# Patient Record
Sex: MALE | Race: ASIAN | NOT HISPANIC OR LATINO | Employment: FULL TIME | URBAN - METROPOLITAN AREA
[De-identification: names, ages, dates, MRNs, and addresses within clinical notes are randomized per-mention and may not be internally consistent; named-entity substitution may affect disease eponyms.]

---

## 2018-02-14 ENCOUNTER — OFFICE VISIT (OUTPATIENT)
Dept: INTERNAL MEDICINE | Facility: CLINIC | Age: 43
End: 2018-02-14

## 2018-02-14 VITALS
SYSTOLIC BLOOD PRESSURE: 132 MMHG | HEIGHT: 73 IN | OXYGEN SATURATION: 100 % | HEART RATE: 72 BPM | DIASTOLIC BLOOD PRESSURE: 82 MMHG | BODY MASS INDEX: 28.26 KG/M2 | WEIGHT: 213.2 LBS

## 2018-02-14 DIAGNOSIS — E03.8 OTHER SPECIFIED HYPOTHYROIDISM: Primary | ICD-10-CM

## 2018-02-14 DIAGNOSIS — R73.03 PREDIABETES: ICD-10-CM

## 2018-02-14 DIAGNOSIS — E78.5 HYPERLIPIDEMIA, UNSPECIFIED HYPERLIPIDEMIA TYPE: ICD-10-CM

## 2018-02-14 DIAGNOSIS — E55.9 VITAMIN D DEFICIENCY: ICD-10-CM

## 2018-02-14 LAB
BILIRUB BLD-MCNC: NEGATIVE MG/DL
CLARITY, POC: CLEAR
COLOR UR: YELLOW
GLUCOSE BLDC GLUCOMTR-MCNC: 179 MG/DL (ref 70–130)
GLUCOSE UR STRIP-MCNC: NEGATIVE MG/DL
HGB BLDA-MCNC: 60 G/DL
KETONES UR QL: NEGATIVE
LEUKOCYTE EST, POC: NEGATIVE
NITRITE UR-MCNC: NEGATIVE MG/ML
PH UR: 6 [PH] (ref 5–8)
PROT UR STRIP-MCNC: NEGATIVE MG/DL
RBC # UR STRIP: NEGATIVE /UL
SP GR UR: 1.01 (ref 1–1.03)
UROBILINOGEN UR QL: NORMAL

## 2018-02-14 PROCEDURE — 83036 HEMOGLOBIN GLYCOSYLATED A1C: CPT | Performed by: INTERNAL MEDICINE

## 2018-02-14 PROCEDURE — 99204 OFFICE O/P NEW MOD 45 MIN: CPT | Performed by: INTERNAL MEDICINE

## 2018-02-14 PROCEDURE — 82947 ASSAY GLUCOSE BLOOD QUANT: CPT | Performed by: INTERNAL MEDICINE

## 2018-02-14 PROCEDURE — 81003 URINALYSIS AUTO W/O SCOPE: CPT | Performed by: INTERNAL MEDICINE

## 2018-02-14 RX ORDER — MULTIVIT-MIN/IRON/FOLIC ACID/K 18-600-40
CAPSULE ORAL DAILY
COMMUNITY

## 2018-02-14 RX ORDER — CHLORAL HYDRATE 500 MG
1000 CAPSULE ORAL 2 TIMES DAILY WITH MEALS
COMMUNITY
End: 2020-08-19

## 2018-02-14 RX ORDER — LEVOTHYROXINE SODIUM 0.05 MG/1
50 TABLET ORAL DAILY
Qty: 90 TABLET | Refills: 1 | Status: SHIPPED | OUTPATIENT
Start: 2018-02-14 | End: 2018-08-22 | Stop reason: SDUPTHER

## 2018-02-14 RX ORDER — LEVOTHYROXINE SODIUM 0.05 MG/1
50 TABLET ORAL DAILY
COMMUNITY
End: 2018-02-14 | Stop reason: SDUPTHER

## 2018-02-14 NOTE — PROGRESS NOTES
Establish Care (New pt check up)    Subjective   Jose Cobos is a 42 y.o. male is here today to establish.    History of Present Illness   Has a h/o Hyper thyroid, and then hypothyroid., prediabetes started at the same time, weight is stable, works out 2 hrs daily, but unable to lose weight.  Has had a h/o microscopic hematuria, unsure why, no symptoms of kidney stones.  Has a h/o Appendagitis, which resolved on its own after er visit in [facility].    Current Outpatient Prescriptions:   •  Cholecalciferol (VITAMIN D) 2000 units capsule, Take  by mouth Daily., Disp: , Rfl:   •  levothyroxine (SYNTHROID, LEVOTHROID) 50 MCG tablet, Take 1 tablet by mouth Daily., Disp: 90 tablet, Rfl: 1  •  Multiple Vitamin (MULTI VITAMIN DAILY PO), Take  by mouth Daily., Disp: , Rfl:   •  Omega-3 Fatty Acids (FISH OIL) 1000 MG capsule capsule, Take 1,000 mg by mouth 2 (Two) Times a Day With Meals., Disp: , Rfl:   •  metFORMIN ER (GLUCOPHAGE XR) 500 MG 24 hr tablet, Take 1 tablet by mouth Daily With Breakfast., Disp: 30 tablet, Rfl: 5      The following portions of the patient's history were reviewed and updated as appropriate: allergies, current medications, past family history, past medical history, past social history, past surgical history and problem list.    Review of Systems   Constitutional: Negative.  Negative for chills and fever.   HENT: Negative for ear discharge, ear pain, sinus pressure and sore throat.    Respiratory: Negative for cough, chest tightness and shortness of breath.    Cardiovascular: Negative for chest pain, palpitations and leg swelling.   Gastrointestinal: Negative for diarrhea, nausea and vomiting.   Musculoskeletal: Negative for arthralgias, back pain and myalgias.   Neurological: Negative for dizziness, syncope and headaches.   Psychiatric/Behavioral: Negative for confusion and sleep disturbance.       Objective   /82 (BP Location: Right arm, Patient Position: Sitting, Cuff Size: Adult)   "Pulse 72  Ht 185.4 cm (73\")  Wt 96.7 kg (213 lb 3.2 oz)  SpO2 100%  BMI 28.13 kg/m2  Physical Exam   Constitutional: He is oriented to person, place, and time. He appears well-developed and well-nourished.   HENT:   Head: Normocephalic and atraumatic.   Right Ear: External ear normal.   Left Ear: External ear normal.   Mouth/Throat: No oropharyngeal exudate.   Eyes: Conjunctivae are normal. Pupils are equal, round, and reactive to light.   Neck: Neck supple. No thyromegaly present.   Cardiovascular: Normal rate, regular rhythm and intact distal pulses.    Pulmonary/Chest: Effort normal and breath sounds normal.   Abdominal: Soft. Bowel sounds are normal. He exhibits no distension. There is no tenderness.   Musculoskeletal: He exhibits no edema.   Neurological: He is alert and oriented to person, place, and time. No cranial nerve deficit.   Skin: Skin is warm and dry.   Psychiatric: He has a normal mood and affect. Judgment normal.   Nursing note and vitals reviewed.        Results for orders placed or performed in visit on 02/14/18   Comprehensive Metabolic Panel   Result Value Ref Range    Glucose 136 (H) 70 - 100 mg/dL    BUN 11 9 - 23 mg/dL    Creatinine 0.90 0.60 - 1.30 mg/dL    Sodium 138 132 - 146 mmol/L    Potassium 4.6 3.5 - 5.5 mmol/L    Chloride 106 99 - 109 mmol/L    CO2 27.0 20.0 - 31.0 mmol/L    Calcium 9.5 8.7 - 10.4 mg/dL    Total Protein 7.4 5.7 - 8.2 g/dL    Albumin 4.50 3.20 - 4.80 g/dL    ALT (SGPT) 57 (H) 7 - 40 U/L    AST (SGOT) 43 (H) 0 - 33 U/L    Alkaline Phosphatase 82 25 - 100 U/L    Total Bilirubin 0.7 0.3 - 1.2 mg/dL    eGFR Non African Amer 93 >60 mL/min/1.73    eGFR  African Amer 113 >60 mL/min/1.73    Globulin 2.9 gm/dL    A/G Ratio 1.6 1.5 - 2.5 g/dL    BUN/Creatinine Ratio 12.2 7.0 - 25.0    Anion Gap 5.0 3.0 - 11.0 mmol/L   Lipid Panel   Result Value Ref Range    Total Cholesterol 179 0 - 200 mg/dL    Triglycerides 97 0 - 150 mg/dL    HDL Cholesterol 40 40 - 60 mg/dL    LDL " Cholesterol  134 (H) 0 - 130 mg/dL   TSH   Result Value Ref Range    TSH 2.320 0.350 - 5.350 mIU/mL   T4, Free   Result Value Ref Range    Free T4 1.21 0.89 - 1.76 ng/dL   Vitamin D 25 Hydroxy   Result Value Ref Range    25 Hydroxy, Vitamin D 27.6 ng/ml   POCT urinalysis dipstick, automated   Result Value Ref Range    Color Yellow Yellow, Straw, Dark Yellow, Rylee    Clarity, UA Clear Clear    Glucose, UA Negative Negative, 1000 mg/dL (3+) mg/dL    Bilirubin Negative Negative    Ketones, UA Negative Negative    Specific Gravity  1.015 1.005 - 1.030    Blood, UA Negative Negative    pH, Urine 6.0 5.0 - 8.0    Protein, POC Negative Negative mg/dL    Urobilinogen, UA Normal Normal    Leukocytes Negative Negative    Nitrite, UA Negative Negative   POCT Glucose   Result Value Ref Range    Glucose 179 (A) 70 - 130 mg/dL   POCT hemoglobin   Result Value Ref Range    Hemoglobin 60.0 g/dL             Assessment/Plan   Diagnoses and all orders for this visit:    Other specified hypothyroidism  -     levothyroxine (SYNTHROID, LEVOTHROID) 50 MCG tablet; Take 1 tablet by mouth Daily.  -     TSH  -     T4, Free    Hyperlipidemia, unspecified hyperlipidemia type  -     Comprehensive Metabolic Panel  -     Lipid Panel    Prediabetes  -     POCT urinalysis dipstick, automated  -     POCT Glucose  -     POCT hemoglobin  -     metFORMIN ER (GLUCOPHAGE XR) 500 MG 24 hr tablet; Take 1 tablet by mouth Daily With Breakfast.    Vitamin D deficiency  -     Vitamin D 25 Hydroxy    Other orders  -     Discontinue: levothyroxine (SYNTHROID, LEVOTHROID) 50 MCG tablet; Take 50 mcg by mouth Daily.  -     Cholecalciferol (VITAMIN D) 2000 units capsule; Take  by mouth Daily.  -     Multiple Vitamin (MULTI VITAMIN DAILY PO); Take  by mouth Daily.  -     Omega-3 Fatty Acids (FISH OIL) 1000 MG capsule capsule; Take 1,000 mg by mouth 2 (Two) Times a Day With Meals.      Counseled diet and exercise. Hold metformin. Check ua to eval kidneys and refer to  urology if needed.       Start metformin to help with sugars and with weight.    Return in about 6 months (around 8/14/2018) for Annual physical.

## 2018-02-15 LAB
25(OH)D3 SERPL-MCNC: 27.6 NG/ML
ALBUMIN SERPL-MCNC: 4.5 G/DL (ref 3.2–4.8)
ALBUMIN/GLOB SERPL: 1.6 G/DL (ref 1.5–2.5)
ALP SERPL-CCNC: 82 U/L (ref 25–100)
ALT SERPL W P-5'-P-CCNC: 57 U/L (ref 7–40)
ANION GAP SERPL CALCULATED.3IONS-SCNC: 5 MMOL/L (ref 3–11)
ARTICHOKE IGE QN: 134 MG/DL (ref 0–130)
AST SERPL-CCNC: 43 U/L (ref 0–33)
BILIRUB SERPL-MCNC: 0.7 MG/DL (ref 0.3–1.2)
BUN BLD-MCNC: 11 MG/DL (ref 9–23)
BUN/CREAT SERPL: 12.2 (ref 7–25)
CALCIUM SPEC-SCNC: 9.5 MG/DL (ref 8.7–10.4)
CHLORIDE SERPL-SCNC: 106 MMOL/L (ref 99–109)
CHOLEST SERPL-MCNC: 179 MG/DL (ref 0–200)
CO2 SERPL-SCNC: 27 MMOL/L (ref 20–31)
CREAT BLD-MCNC: 0.9 MG/DL (ref 0.6–1.3)
GFR SERPL CREATININE-BSD FRML MDRD: 113 ML/MIN/1.73
GFR SERPL CREATININE-BSD FRML MDRD: 93 ML/MIN/1.73
GLOBULIN UR ELPH-MCNC: 2.9 GM/DL
GLUCOSE BLD-MCNC: 136 MG/DL (ref 70–100)
HDLC SERPL-MCNC: 40 MG/DL (ref 40–60)
POTASSIUM BLD-SCNC: 4.6 MMOL/L (ref 3.5–5.5)
PROT SERPL-MCNC: 7.4 G/DL (ref 5.7–8.2)
SODIUM BLD-SCNC: 138 MMOL/L (ref 132–146)
T4 FREE SERPL-MCNC: 1.21 NG/DL (ref 0.89–1.76)
TRIGL SERPL-MCNC: 97 MG/DL (ref 0–150)
TSH SERPL DL<=0.05 MIU/L-ACNC: 2.32 MIU/ML (ref 0.35–5.35)

## 2018-02-15 PROCEDURE — 80053 COMPREHEN METABOLIC PANEL: CPT | Performed by: INTERNAL MEDICINE

## 2018-02-15 PROCEDURE — 84439 ASSAY OF FREE THYROXINE: CPT | Performed by: INTERNAL MEDICINE

## 2018-02-15 PROCEDURE — 80061 LIPID PANEL: CPT | Performed by: INTERNAL MEDICINE

## 2018-02-15 PROCEDURE — 82306 VITAMIN D 25 HYDROXY: CPT | Performed by: INTERNAL MEDICINE

## 2018-02-15 PROCEDURE — 84443 ASSAY THYROID STIM HORMONE: CPT | Performed by: INTERNAL MEDICINE

## 2018-02-17 RX ORDER — METFORMIN HYDROCHLORIDE 500 MG/1
500 TABLET, EXTENDED RELEASE ORAL
Qty: 30 TABLET | Refills: 5 | Status: SHIPPED | OUTPATIENT
Start: 2018-02-17 | End: 2018-04-20 | Stop reason: SDUPTHER

## 2018-04-20 DIAGNOSIS — R73.03 PREDIABETES: ICD-10-CM

## 2018-04-20 RX ORDER — METFORMIN HYDROCHLORIDE 500 MG/1
500 TABLET, EXTENDED RELEASE ORAL
Qty: 90 TABLET | Refills: 1 | Status: SHIPPED | OUTPATIENT
Start: 2018-04-20 | End: 2018-08-28 | Stop reason: SDUPTHER

## 2018-05-18 ENCOUNTER — OFFICE VISIT (OUTPATIENT)
Dept: INTERNAL MEDICINE | Facility: CLINIC | Age: 43
End: 2018-05-18

## 2018-05-18 VITALS
HEART RATE: 88 BPM | SYSTOLIC BLOOD PRESSURE: 138 MMHG | WEIGHT: 204 LBS | TEMPERATURE: 98.4 F | BODY MASS INDEX: 27.04 KG/M2 | OXYGEN SATURATION: 99 % | HEIGHT: 73 IN | DIASTOLIC BLOOD PRESSURE: 92 MMHG

## 2018-05-18 DIAGNOSIS — J01.41 ACUTE RECURRENT PANSINUSITIS: Primary | ICD-10-CM

## 2018-05-18 PROCEDURE — 99213 OFFICE O/P EST LOW 20 MIN: CPT | Performed by: INTERNAL MEDICINE

## 2018-05-18 PROCEDURE — 96372 THER/PROPH/DIAG INJ SC/IM: CPT | Performed by: INTERNAL MEDICINE

## 2018-05-18 RX ORDER — FLUTICASONE PROPIONATE 50 MCG
2 SPRAY, SUSPENSION (ML) NASAL DAILY
Qty: 1 BOTTLE | Refills: 0 | Status: SHIPPED | OUTPATIENT
Start: 2018-05-18 | End: 2018-06-01

## 2018-05-18 RX ORDER — METHYLPREDNISOLONE ACETATE 40 MG/ML
40 INJECTION, SUSPENSION INTRA-ARTICULAR; INTRALESIONAL; INTRAMUSCULAR; SOFT TISSUE ONCE
Status: COMPLETED | OUTPATIENT
Start: 2018-05-18 | End: 2018-05-18

## 2018-05-18 RX ORDER — AMOXICILLIN AND CLAVULANATE POTASSIUM 875; 125 MG/1; MG/1
1 TABLET, FILM COATED ORAL EVERY 12 HOURS SCHEDULED
Qty: 20 TABLET | Refills: 0 | Status: SHIPPED | OUTPATIENT
Start: 2018-05-18 | End: 2018-06-01

## 2018-05-18 RX ADMIN — METHYLPREDNISOLONE ACETATE 40 MG: 40 INJECTION, SUSPENSION INTRA-ARTICULAR; INTRALESIONAL; INTRAMUSCULAR; SOFT TISSUE at 09:03

## 2018-05-18 NOTE — PROGRESS NOTES
Sinusitis (x3 days ago ; )    Subjective   Jose Cobos is a 42 y.o. male is here today for follow-up.    History of Present Illness   Started having sxs x 10 + days, and having sinus pressure on his left frontal and maxillary areas.   Planning to fly out to [place] on June 7th, but has his swearing in ceremony  May 30th. Would like t feel better before all that.    Current Outpatient Prescriptions:   •  Cholecalciferol (VITAMIN D) 2000 units capsule, Take  by mouth Daily., Disp: , Rfl:   •  levothyroxine (SYNTHROID, LEVOTHROID) 50 MCG tablet, Take 1 tablet by mouth Daily., Disp: 90 tablet, Rfl: 1  •  Multiple Vitamin (MULTI VITAMIN DAILY PO), Take  by mouth Daily., Disp: , Rfl:   •  Omega-3 Fatty Acids (FISH OIL) 1000 MG capsule capsule, Take 1,000 mg by mouth 2 (Two) Times a Day With Meals., Disp: , Rfl:   •  amoxicillin-clavulanate (AUGMENTIN) 875-125 MG per tablet, Take 1 tablet by mouth Every 12 (Twelve) Hours., Disp: 20 tablet, Rfl: 0  •  fluticasone (FLONASE) 50 MCG/ACT nasal spray, 2 sprays into each nostril Daily for 30 days. Administer 2 sprays in each nostril for each dose for allergies, Disp: 1 bottle, Rfl: 0  •  metFORMIN ER (GLUCOPHAGE XR) 500 MG 24 hr tablet, Take 1 tablet by mouth Daily With Breakfast., Disp: 90 tablet, Rfl: 1  No current facility-administered medications for this visit.       The following portions of the patient's history were reviewed and updated as appropriate: allergies, current medications, past family history, past medical history, past social history, past surgical history and problem list.    Review of Systems   Constitutional: Positive for chills. Negative for fever.   HENT: Positive for congestion, postnasal drip, sinus pain, sinus pressure and sore throat. Negative for ear discharge and ear pain.    Respiratory: Negative for cough, chest tightness and shortness of breath.    Cardiovascular: Negative for chest pain, palpitations and leg swelling.   Gastrointestinal:  "Negative for diarrhea, nausea and vomiting.   Musculoskeletal: Negative for arthralgias, back pain and myalgias.   Neurological: Negative for dizziness, syncope and headaches.   Psychiatric/Behavioral: Negative for confusion and sleep disturbance.       Objective   /92   Pulse 88   Temp 98.4 °F (36.9 °C) (Oral)   Ht 185.4 cm (73\")   Wt 92.5 kg (204 lb)   SpO2 99%   BMI 26.91 kg/m²   Physical Exam   Constitutional: He is oriented to person, place, and time. He appears well-developed and well-nourished.   HENT:   Head: Normocephalic and atraumatic.   Right Ear: Tympanic membrane is bulging.   Left Ear: Tympanic membrane is bulging.   Mouth/Throat: Posterior oropharyngeal erythema present. No oropharyngeal exudate or tonsillar abscesses.   Eyes: Pupils are equal, round, and reactive to light.   Neck: Normal range of motion.   Cardiovascular: Normal rate and regular rhythm.    Pulmonary/Chest: Effort normal and breath sounds normal. No stridor.   Abdominal: Soft. Bowel sounds are normal.   Lymphadenopathy:     He has no cervical adenopathy.   Neurological: He is alert and oriented to person, place, and time.   Skin: Skin is warm and dry.   Psychiatric: He has a normal mood and affect.         Results for orders placed or performed in visit on 02/14/18   Comprehensive Metabolic Panel   Result Value Ref Range    Glucose 136 (H) 70 - 100 mg/dL    BUN 11 9 - 23 mg/dL    Creatinine 0.90 0.60 - 1.30 mg/dL    Sodium 138 132 - 146 mmol/L    Potassium 4.6 3.5 - 5.5 mmol/L    Chloride 106 99 - 109 mmol/L    CO2 27.0 20.0 - 31.0 mmol/L    Calcium 9.5 8.7 - 10.4 mg/dL    Total Protein 7.4 5.7 - 8.2 g/dL    Albumin 4.50 3.20 - 4.80 g/dL    ALT (SGPT) 57 (H) 7 - 40 U/L    AST (SGOT) 43 (H) 0 - 33 U/L    Alkaline Phosphatase 82 25 - 100 U/L    Total Bilirubin 0.7 0.3 - 1.2 mg/dL    eGFR Non African Amer 93 >60 mL/min/1.73    eGFR  African Amer 113 >60 mL/min/1.73    Globulin 2.9 gm/dL    A/G Ratio 1.6 1.5 - 2.5 g/dL    " BUN/Creatinine Ratio 12.2 7.0 - 25.0    Anion Gap 5.0 3.0 - 11.0 mmol/L   Lipid Panel   Result Value Ref Range    Total Cholesterol 179 0 - 200 mg/dL    Triglycerides 97 0 - 150 mg/dL    HDL Cholesterol 40 40 - 60 mg/dL    LDL Cholesterol  134 (H) 0 - 130 mg/dL   TSH   Result Value Ref Range    TSH 2.320 0.350 - 5.350 mIU/mL   T4, Free   Result Value Ref Range    Free T4 1.21 0.89 - 1.76 ng/dL   Vitamin D 25 Hydroxy   Result Value Ref Range    25 Hydroxy, Vitamin D 27.6 ng/ml   POCT urinalysis dipstick, automated   Result Value Ref Range    Color Yellow Yellow, Straw, Dark Yellow, Rylee    Clarity, UA Clear Clear    Glucose, UA Negative Negative, 1000 mg/dL (3+) mg/dL    Bilirubin Negative Negative    Ketones, UA Negative Negative    Specific Gravity  1.015 1.005 - 1.030    Blood, UA Negative Negative    pH, Urine 6.0 5.0 - 8.0    Protein, POC Negative Negative mg/dL    Urobilinogen, UA Normal Normal    Leukocytes Negative Negative    Nitrite, UA Negative Negative   POCT Glucose   Result Value Ref Range    Glucose 179 (A) 70 - 130 mg/dL   POCT hemoglobin   Result Value Ref Range    Hemoglobin 60.0 g/dL             Assessment/Plan   Diagnoses and all orders for this visit:    Acute recurrent pansinusitis  -     amoxicillin-clavulanate (AUGMENTIN) 875-125 MG per tablet; Take 1 tablet by mouth Every 12 (Twelve) Hours.  -     methylPREDNISolone acetate (DEPO-medrol) injection 40 mg; Inject 1 mL into the shoulder, thigh, or buttocks 1 (One) Time.  -     fluticasone (FLONASE) 50 MCG/ACT nasal spray; 2 sprays into each nostril Daily for 30 days. Administer 2 sprays in each nostril for each dose for allergies      continue otc supportive care and meds. Adv. BP elevated likely from sudafed. Monitor for now.           Return if symptoms worsen or fail to improve.

## 2018-05-31 ENCOUNTER — HOSPITAL ENCOUNTER (EMERGENCY)
Facility: HOSPITAL | Age: 43
Discharge: HOME | End: 2018-05-31
Attending: EMERGENCY MEDICINE | Admitting: EMERGENCY MEDICINE

## 2018-05-31 ENCOUNTER — APPOINTMENT (OUTPATIENT)
Dept: GENERAL RADIOLOGY | Facility: HOSPITAL | Age: 43
End: 2018-05-31

## 2018-05-31 VITALS
TEMPERATURE: 97.8 F | RESPIRATION RATE: 18 BRPM | BODY MASS INDEX: 27.3 KG/M2 | WEIGHT: 206 LBS | SYSTOLIC BLOOD PRESSURE: 161 MMHG | DIASTOLIC BLOOD PRESSURE: 98 MMHG | HEIGHT: 73 IN | OXYGEN SATURATION: 98 % | HEART RATE: 85 BPM

## 2018-05-31 DIAGNOSIS — R09.1 PLEURISY: ICD-10-CM

## 2018-05-31 DIAGNOSIS — S29.019A STRAIN OF THORACIC REGION, INITIAL ENCOUNTER: Primary | ICD-10-CM

## 2018-05-31 DIAGNOSIS — M54.6 ACUTE LEFT-SIDED THORACIC BACK PAIN: ICD-10-CM

## 2018-05-31 LAB
ANION GAP SERPL CALCULATED.3IONS-SCNC: 9 MMOL/L (ref 3–11)
BASOPHILS # BLD AUTO: 0.08 10*3/MM3 (ref 0–0.2)
BASOPHILS NFR BLD AUTO: 0.6 % (ref 0–1)
BILIRUB UR QL STRIP: NEGATIVE
BNP SERPL-MCNC: <2 PG/ML (ref 0–100)
BUN BLD-MCNC: 9 MG/DL (ref 9–23)
BUN/CREAT SERPL: 9 (ref 7–25)
CALCIUM SPEC-SCNC: 9.5 MG/DL (ref 8.7–10.4)
CHLORIDE SERPL-SCNC: 103 MMOL/L (ref 99–109)
CLARITY UR: CLEAR
CO2 SERPL-SCNC: 27 MMOL/L (ref 20–31)
COLOR UR: YELLOW
CREAT BLD-MCNC: 1 MG/DL (ref 0.6–1.3)
D DIMER PPP FEU-MCNC: <0.19 MG/L (FEU) (ref 0–0.5)
DEPRECATED RDW RBC AUTO: 39.6 FL (ref 37–54)
EOSINOPHIL # BLD AUTO: 0.14 10*3/MM3 (ref 0–0.3)
EOSINOPHIL NFR BLD AUTO: 1.1 % (ref 0–3)
ERYTHROCYTE [DISTWIDTH] IN BLOOD BY AUTOMATED COUNT: 12.9 % (ref 11.3–14.5)
GFR SERPL CREATININE-BSD FRML MDRD: 100 ML/MIN/1.73
GFR SERPL CREATININE-BSD FRML MDRD: 82 ML/MIN/1.73
GLUCOSE BLD-MCNC: 130 MG/DL (ref 70–100)
GLUCOSE UR STRIP-MCNC: NEGATIVE MG/DL
HCT VFR BLD AUTO: 42.8 % (ref 38.9–50.9)
HGB BLD-MCNC: 14.5 G/DL (ref 13.1–17.5)
HGB UR QL STRIP.AUTO: NEGATIVE
IMM GRANULOCYTES # BLD: 0.04 10*3/MM3 (ref 0–0.03)
IMM GRANULOCYTES NFR BLD: 0.3 % (ref 0–0.6)
KETONES UR QL STRIP: NEGATIVE
LEUKOCYTE ESTERASE UR QL STRIP.AUTO: NEGATIVE
LYMPHOCYTES # BLD AUTO: 4.08 10*3/MM3 (ref 0.6–4.8)
LYMPHOCYTES NFR BLD AUTO: 30.6 % (ref 24–44)
MCH RBC QN AUTO: 29.1 PG (ref 27–31)
MCHC RBC AUTO-ENTMCNC: 33.9 G/DL (ref 32–36)
MCV RBC AUTO: 85.8 FL (ref 80–99)
MONOCYTES # BLD AUTO: 1.3 10*3/MM3 (ref 0–1)
MONOCYTES NFR BLD AUTO: 9.8 % (ref 0–12)
NEUTROPHILS # BLD AUTO: 7.73 10*3/MM3 (ref 1.5–8.3)
NEUTROPHILS NFR BLD AUTO: 57.9 % (ref 41–71)
NITRITE UR QL STRIP: NEGATIVE
PH UR STRIP.AUTO: 7 [PH] (ref 5–8)
PLATELET # BLD AUTO: 256 10*3/MM3 (ref 150–450)
PMV BLD AUTO: 11.2 FL (ref 6–12)
POTASSIUM BLD-SCNC: 3.8 MMOL/L (ref 3.5–5.5)
PROT UR QL STRIP: NEGATIVE
RBC # BLD AUTO: 4.99 10*6/MM3 (ref 4.2–5.76)
SODIUM BLD-SCNC: 139 MMOL/L (ref 132–146)
SP GR UR STRIP: <=1.005 (ref 1–1.03)
TROPONIN I SERPL-MCNC: 0 NG/ML (ref 0–0.07)
TROPONIN I SERPL-MCNC: 0 NG/ML (ref 0–0.07)
UROBILINOGEN UR QL STRIP: NORMAL
WBC NRBC COR # BLD: 13.33 10*3/MM3 (ref 3.5–10.8)

## 2018-05-31 PROCEDURE — 99284 EMERGENCY DEPT VISIT MOD MDM: CPT

## 2018-05-31 PROCEDURE — 93005 ELECTROCARDIOGRAM TRACING: CPT | Performed by: EMERGENCY MEDICINE

## 2018-05-31 PROCEDURE — 85025 COMPLETE CBC W/AUTO DIFF WBC: CPT | Performed by: EMERGENCY MEDICINE

## 2018-05-31 PROCEDURE — 83880 ASSAY OF NATRIURETIC PEPTIDE: CPT | Performed by: EMERGENCY MEDICINE

## 2018-05-31 PROCEDURE — 71045 X-RAY EXAM CHEST 1 VIEW: CPT

## 2018-05-31 PROCEDURE — 81003 URINALYSIS AUTO W/O SCOPE: CPT | Performed by: EMERGENCY MEDICINE

## 2018-05-31 PROCEDURE — 84484 ASSAY OF TROPONIN QUANT: CPT

## 2018-05-31 PROCEDURE — 80048 BASIC METABOLIC PNL TOTAL CA: CPT | Performed by: EMERGENCY MEDICINE

## 2018-05-31 PROCEDURE — 85379 FIBRIN DEGRADATION QUANT: CPT | Performed by: EMERGENCY MEDICINE

## 2018-05-31 RX ORDER — SODIUM CHLORIDE 0.9 % (FLUSH) 0.9 %
10 SYRINGE (ML) INJECTION AS NEEDED
Status: DISCONTINUED | OUTPATIENT
Start: 2018-05-31 | End: 2018-05-31 | Stop reason: HOSPADM

## 2018-05-31 RX ORDER — NAPROXEN 500 MG/1
500 TABLET ORAL 2 TIMES DAILY PRN
Qty: 20 TABLET | Refills: 0 | Status: SHIPPED | OUTPATIENT
Start: 2018-05-31 | End: 2018-08-14

## 2018-05-31 RX ORDER — ASPIRIN 325 MG
325 TABLET ORAL ONCE
Status: COMPLETED | OUTPATIENT
Start: 2018-05-31 | End: 2018-05-31

## 2018-05-31 RX ADMIN — ASPIRIN 325 MG ORAL TABLET 325 MG: 325 PILL ORAL at 02:15

## 2018-06-01 ENCOUNTER — OFFICE VISIT (OUTPATIENT)
Dept: INTERNAL MEDICINE | Facility: CLINIC | Age: 43
End: 2018-06-01

## 2018-06-01 VITALS
HEART RATE: 73 BPM | HEIGHT: 73 IN | DIASTOLIC BLOOD PRESSURE: 96 MMHG | BODY MASS INDEX: 27.04 KG/M2 | OXYGEN SATURATION: 100 % | SYSTOLIC BLOOD PRESSURE: 144 MMHG | WEIGHT: 204 LBS

## 2018-06-01 DIAGNOSIS — R09.1 PLEURISY: Primary | ICD-10-CM

## 2018-06-01 DIAGNOSIS — R03.0 ELEVATED BP WITHOUT DIAGNOSIS OF HYPERTENSION: ICD-10-CM

## 2018-06-01 PROCEDURE — 99214 OFFICE O/P EST MOD 30 MIN: CPT | Performed by: INTERNAL MEDICINE

## 2018-06-01 PROCEDURE — 96372 THER/PROPH/DIAG INJ SC/IM: CPT | Performed by: INTERNAL MEDICINE

## 2018-06-01 RX ORDER — METHYLPREDNISOLONE ACETATE 40 MG/ML
40 INJECTION, SUSPENSION INTRA-ARTICULAR; INTRALESIONAL; INTRAMUSCULAR; SOFT TISSUE ONCE
Status: COMPLETED | OUTPATIENT
Start: 2018-06-01 | End: 2018-06-01

## 2018-06-01 RX ADMIN — METHYLPREDNISOLONE ACETATE 40 MG: 40 INJECTION, SUSPENSION INTRA-ARTICULAR; INTRALESIONAL; INTRAMUSCULAR; SOFT TISSUE at 09:10

## 2018-06-01 NOTE — PROGRESS NOTES
Hypertension (Pt states that his BP has been running high ) and Upper back pain (Pt went to ER with pain in upper Left side of back )    Subjective   Jose Cobos is a 42 y.o. male is here today for follow-up.    History of Present Illness   Had LUQ, Left flank pain, and went to the ER at [facility], and was dxed with Pleurisy, as his cardiac emanuel, xrays and UA was negative.   Notes his BP was high and had his friends in with him, and had them take him to the ER.  Started on Naproxen. Since yesterday, he has having high BP readings. Did start taking the naproxen,yesterday, and  Walked around the neighborhood, and BP came down, went to bed, and having issues.    Current Outpatient Prescriptions:   •  Cholecalciferol (VITAMIN D) 2000 units capsule, Take  by mouth Daily., Disp: , Rfl:   •  levothyroxine (SYNTHROID, LEVOTHROID) 50 MCG tablet, Take 1 tablet by mouth Daily., Disp: 90 tablet, Rfl: 1  •  metFORMIN ER (GLUCOPHAGE XR) 500 MG 24 hr tablet, Take 1 tablet by mouth Daily With Breakfast., Disp: 90 tablet, Rfl: 1  •  Multiple Vitamin (MULTI VITAMIN DAILY PO), Take  by mouth Daily., Disp: , Rfl:   •  naproxen (NAPROSYN) 500 MG tablet, Take 1 tablet by mouth 2 (Two) Times a Day As Needed for Mild Pain ., Disp: 20 tablet, Rfl: 0  •  Omega-3 Fatty Acids (FISH OIL) 1000 MG capsule capsule, Take 1,000 mg by mouth 2 (Two) Times a Day With Meals., Disp: , Rfl:   •  metoprolol tartrate (LOPRESSOR) 25 MG tablet, Take 1 tablet by mouth Every 12 (Twelve) Hours As Needed (palpitations)., Disp: 30 tablet, Rfl: 4      The following portions of the patient's history were reviewed and updated as appropriate: allergies, current medications, past family history, past medical history, past social history, past surgical history and problem list.    Review of Systems   Constitutional: Negative.  Negative for chills and fever.   HENT: Negative for ear discharge, ear pain, sinus pressure and sore throat.    Respiratory: Negative for cough,  "chest tightness and shortness of breath.    Cardiovascular: Positive for palpitations. Negative for chest pain and leg swelling.   Gastrointestinal: Positive for abdominal distention and abdominal pain. Negative for diarrhea, nausea and vomiting.   Genitourinary: Positive for flank pain.   Musculoskeletal: Negative for arthralgias, back pain and myalgias.   Neurological: Positive for dizziness. Negative for syncope and headaches.   Psychiatric/Behavioral: Negative for confusion and sleep disturbance.       Objective   /96   Pulse 73   Ht 185.4 cm (73\")   Wt 92.5 kg (204 lb)   SpO2 100%   BMI 26.91 kg/m²   Physical Exam   Constitutional: He is oriented to person, place, and time. He appears well-developed and well-nourished.   HENT:   Head: Normocephalic and atraumatic.   Mouth/Throat: No oropharyngeal exudate.   Eyes: Conjunctivae are normal. Pupils are equal, round, and reactive to light.   Neck: Neck supple. No thyromegaly present.   Cardiovascular: Normal rate and regular rhythm.    Pulmonary/Chest: Effort normal and breath sounds normal.   Abdominal: Soft. Bowel sounds are normal. He exhibits no distension. There is no tenderness.   Musculoskeletal: He exhibits no edema.   Neurological: He is alert and oriented to person, place, and time. No cranial nerve deficit.   Skin: Skin is warm and dry.   Psychiatric: He has a normal mood and affect. Judgment normal.   Nursing note and vitals reviewed.        Results for orders placed or performed during the hospital encounter of 05/31/18   Basic Metabolic Panel   Result Value Ref Range    Glucose 130 (H) 70 - 100 mg/dL    BUN 9 9 - 23 mg/dL    Creatinine 1.00 0.60 - 1.30 mg/dL    Sodium 139 132 - 146 mmol/L    Potassium 3.8 3.5 - 5.5 mmol/L    Chloride 103 99 - 109 mmol/L    CO2 27.0 20.0 - 31.0 mmol/L    Calcium 9.5 8.7 - 10.4 mg/dL    eGFR  African Amer 100 >60 mL/min/1.73    eGFR Non African Amer 82 >60 mL/min/1.73    BUN/Creatinine Ratio 9.0 7.0 - 25.0    " Anion Gap 9.0 3.0 - 11.0 mmol/L   Urinalysis With / Microscopic If Indicated (No Culture) - Urine, Clean Catch   Result Value Ref Range    Color, UA Yellow Yellow, Straw    Appearance, UA Clear Clear    pH, UA 7.0 5.0 - 8.0    Specific Gravity, UA <=1.005 1.001 - 1.030    Glucose, UA Negative Negative    Ketones, UA Negative Negative    Bilirubin, UA Negative Negative    Blood, UA Negative Negative    Protein, UA Negative Negative    Leuk Esterase, UA Negative Negative    Nitrite, UA Negative Negative    Urobilinogen, UA 0.2 E.U./dL 0.2 - 1.0 E.U./dL   BNP   Result Value Ref Range    BNP <2.0 0.0 - 100.0 pg/mL   D-dimer, Quantitative   Result Value Ref Range    D-Dimer, Quantitative <0.19 0.00 - 0.50 mg/L (FEU)   CBC Auto Differential   Result Value Ref Range    WBC 13.33 (H) 3.50 - 10.80 10*3/mm3    RBC 4.99 4.20 - 5.76 10*6/mm3    Hemoglobin 14.5 13.1 - 17.5 g/dL    Hematocrit 42.8 38.9 - 50.9 %    MCV 85.8 80.0 - 99.0 fL    MCH 29.1 27.0 - 31.0 pg    MCHC 33.9 32.0 - 36.0 g/dL    RDW 12.9 11.3 - 14.5 %    RDW-SD 39.6 37.0 - 54.0 fl    MPV 11.2 6.0 - 12.0 fL    Platelets 256 150 - 450 10*3/mm3    Neutrophil % 57.9 41.0 - 71.0 %    Lymphocyte % 30.6 24.0 - 44.0 %    Monocyte % 9.8 0.0 - 12.0 %    Eosinophil % 1.1 0.0 - 3.0 %    Basophil % 0.6 0.0 - 1.0 %    Immature Grans % 0.3 0.0 - 0.6 %    Neutrophils, Absolute 7.73 1.50 - 8.30 10*3/mm3    Lymphocytes, Absolute 4.08 0.60 - 4.80 10*3/mm3    Monocytes, Absolute 1.30 (H) 0.00 - 1.00 10*3/mm3    Eosinophils, Absolute 0.14 0.00 - 0.30 10*3/mm3    Basophils, Absolute 0.08 0.00 - 0.20 10*3/mm3    Immature Grans, Absolute 0.04 (H) 0.00 - 0.03 10*3/mm3   POC Troponin, Rapid   Result Value Ref Range    Troponin I 0.00 0.00 - 0.07 ng/mL   POC Troponin, Rapid   Result Value Ref Range    Troponin I 0.00 0.00 - 0.07 ng/mL             Assessment/Plan   Diagnoses and all orders for this visit:    Pleurisy  Comments:  tylenol today and Medrol.  Orders:  -     methylPREDNISolone  acetate (DEPO-medrol) injection 40 mg; Inject 1 mL into the shoulder, thigh, or buttocks 1 (One) Time.    Elevated BP without diagnosis of hypertension  Comments:  call if perisitently high with palps, will start toprol, for situational anxiety.  Orders:  -     metoprolol tartrate (LOPRESSOR) 25 MG tablet; Take 1 tablet by mouth Every 12 (Twelve) Hours As Needed (palpitations).    Reviewed ER records, diagnostics and labs.       Hold off abx today. Start toprol prn if needed .Counseled extensively re: BP checks, and causes of his raised numbers. He denies anxiety, but is by himself, Family is in [place], and he is leaving next week.    Return if symptoms worsen or fail to improve.

## 2018-08-14 ENCOUNTER — OFFICE VISIT (OUTPATIENT)
Dept: INTERNAL MEDICINE | Facility: CLINIC | Age: 43
End: 2018-08-14

## 2018-08-14 VITALS
BODY MASS INDEX: 27.47 KG/M2 | HEART RATE: 85 BPM | DIASTOLIC BLOOD PRESSURE: 76 MMHG | WEIGHT: 208.2 LBS | OXYGEN SATURATION: 100 % | SYSTOLIC BLOOD PRESSURE: 126 MMHG

## 2018-08-14 DIAGNOSIS — E03.8 OTHER SPECIFIED HYPOTHYROIDISM: ICD-10-CM

## 2018-08-14 DIAGNOSIS — J30.2 CHRONIC SEASONAL ALLERGIC RHINITIS DUE TO OTHER ALLERGEN: ICD-10-CM

## 2018-08-14 DIAGNOSIS — I10 ESSENTIAL HYPERTENSION: ICD-10-CM

## 2018-08-14 DIAGNOSIS — Z00.00 ANNUAL PHYSICAL EXAM: Primary | ICD-10-CM

## 2018-08-14 DIAGNOSIS — R73.03 PREDIABETES: ICD-10-CM

## 2018-08-14 LAB
ANION GAP SERPL CALCULATED.3IONS-SCNC: 7 MMOL/L (ref 3–11)
BILIRUB BLD-MCNC: NEGATIVE MG/DL
BUN BLD-MCNC: 9 MG/DL (ref 9–23)
BUN/CREAT SERPL: 10.5 (ref 7–25)
CALCIUM SPEC-SCNC: 9.8 MG/DL (ref 8.7–10.4)
CHLORIDE SERPL-SCNC: 105 MMOL/L (ref 99–109)
CLARITY, POC: CLEAR
CO2 SERPL-SCNC: 27 MMOL/L (ref 20–31)
COLOR UR: YELLOW
CREAT BLD-MCNC: 0.86 MG/DL (ref 0.6–1.3)
GFR SERPL CREATININE-BSD FRML MDRD: 118 ML/MIN/1.73
GFR SERPL CREATININE-BSD FRML MDRD: 98 ML/MIN/1.73
GLUCOSE BLD-MCNC: 95 MG/DL (ref 70–100)
GLUCOSE BLDC GLUCOMTR-MCNC: 149 MG/DL (ref 70–130)
GLUCOSE UR STRIP-MCNC: NEGATIVE MG/DL
HBA1C MFR BLD: 6.2 %
KETONES UR QL: NEGATIVE
LEUKOCYTE EST, POC: NEGATIVE
NITRITE UR-MCNC: NEGATIVE MG/ML
PH UR: 5 [PH] (ref 5–8)
POTASSIUM BLD-SCNC: 4 MMOL/L (ref 3.5–5.5)
PROT UR STRIP-MCNC: NEGATIVE MG/DL
RBC # UR STRIP: ABNORMAL /UL
SODIUM BLD-SCNC: 139 MMOL/L (ref 132–146)
SP GR UR: 1.02 (ref 1–1.03)
UROBILINOGEN UR QL: NORMAL

## 2018-08-14 PROCEDURE — 81003 URINALYSIS AUTO W/O SCOPE: CPT | Performed by: INTERNAL MEDICINE

## 2018-08-14 PROCEDURE — 82947 ASSAY GLUCOSE BLOOD QUANT: CPT | Performed by: INTERNAL MEDICINE

## 2018-08-14 PROCEDURE — 83036 HEMOGLOBIN GLYCOSYLATED A1C: CPT | Performed by: INTERNAL MEDICINE

## 2018-08-14 PROCEDURE — 80048 BASIC METABOLIC PNL TOTAL CA: CPT | Performed by: INTERNAL MEDICINE

## 2018-08-14 PROCEDURE — 99396 PREV VISIT EST AGE 40-64: CPT | Performed by: INTERNAL MEDICINE

## 2018-08-14 RX ORDER — TELMISARTAN 20 MG/1
20 TABLET ORAL DAILY
Qty: 30 TABLET
Start: 2018-08-14 | End: 2018-08-14 | Stop reason: SDUPTHER

## 2018-08-14 RX ORDER — ASCORBIC ACID 100 MG
TABLET,CHEWABLE ORAL
COMMUNITY

## 2018-08-14 RX ORDER — TELMISARTAN 20 MG/1
20 TABLET ORAL DAILY
Qty: 30 TABLET | Refills: 5 | Status: SHIPPED | OUTPATIENT
Start: 2018-08-14 | End: 2018-11-28 | Stop reason: SDUPTHER

## 2018-08-14 RX ORDER — LEVOCETIRIZINE DIHYDROCHLORIDE 5 MG/1
5 TABLET, FILM COATED ORAL EVERY EVENING
Start: 2018-08-14 | End: 2018-12-13

## 2018-08-14 NOTE — PROGRESS NOTES
Chief Complaint   Patient presents with   • Annual Exam       History of Present Illness  HM, Adult Male: The patient is being seen for a health maintenance evaluation. The last health maintenance visit was 1 year(s) ago.   Social History: Household members include spouse. He is  with children. Work status: working full time. The patient has never smoked cigarettes. He reports occ. drinking alcohol. He has never used illicit drugs.   General Health: The patient's health is described as good. He has regular dental visits. He denies vision problems. He denies hearing loss. Immunizations status: up to date.   Lifestyle:. He consumes a diverse and healthy diet. He does not have any weight concerns. He exercises regularly. He does not use tobacco. He reports occasional alcohol use. He denies drug use.   Screening: Cancer screening reviewed and current.   Metabolic screening reviewed and current.   Risk screening reviewed and current.     He was able to come off the metoprolol after a week. BP was 140/98, so started on Amlodipine 2.5mg, took it x 10 days, and then saw Cardiology, who put him on Telmisartan.  Had a complete check up- EKG unchanged.   A1C was 6.3, and glu- 118, and tsh- 3.7.  Abd US was c/w grade 1 fatty liver.  Now he reports that he is having some allergies , taking xyzal that he got from [facility].    Review of Systems   Constitutional: Negative.  Negative for chills and fever.   HENT: Positive for congestion and postnasal drip. Negative for ear discharge, ear pain, sinus pressure and sore throat.    Eyes: Negative for pain and redness.   Respiratory: Negative for cough, chest tightness and shortness of breath.    Cardiovascular: Negative for chest pain, palpitations and leg swelling.   Gastrointestinal: Negative for diarrhea, nausea and vomiting.   Endocrine: Negative for polydipsia and polyphagia.   Genitourinary: Negative for dysuria, frequency and urgency.   Musculoskeletal: Negative for  arthralgias, back pain and myalgias.   Neurological: Positive for headaches (better). Negative for dizziness and syncope.   Psychiatric/Behavioral: Negative for confusion and sleep disturbance.       Patient Active Problem List   Diagnosis   • Other specified hypothyroidism   • Hyperlipidemia   • Prediabetes   • Essential hypertension       Social History     Social History   • Marital status:      Spouse name: N/A   • Number of children: N/A   • Years of education: N/A     Occupational History   • Not on file.     Social History Main Topics   • Smoking status: Never Smoker   • Smokeless tobacco: Never Used   • Alcohol use Yes      Comment: occasionally   • Drug use: Unknown   • Sexual activity: Not on file     Other Topics Concern   • Not on file     Social History Narrative   • No narrative on file       Current Outpatient Prescriptions on File Prior to Visit   Medication Sig Dispense Refill   • Cholecalciferol (VITAMIN D) 2000 units capsule Take  by mouth Daily.     • levothyroxine (SYNTHROID, LEVOTHROID) 50 MCG tablet Take 1 tablet by mouth Daily. 90 tablet 1   • metFORMIN ER (GLUCOPHAGE XR) 500 MG 24 hr tablet Take 1 tablet by mouth Daily With Breakfast. 90 tablet 1   • Multiple Vitamin (MULTI VITAMIN DAILY PO) Take  by mouth Daily.     • Omega-3 Fatty Acids (FISH OIL) 1000 MG capsule capsule Take 1,000 mg by mouth 2 (Two) Times a Day With Meals.     • metoprolol tartrate (LOPRESSOR) 25 MG tablet Take 1 tablet by mouth Every 12 (Twelve) Hours As Needed (palpitations). 30 tablet 4     No current facility-administered medications on file prior to visit.        No Known Allergies    /76   Pulse 85   Wt 94.4 kg (208 lb 3.2 oz)   SpO2 100%   BMI 27.47 kg/m²          The following portions of the patient's history were reviewed and updated as appropriate: allergies, current medications, past family history, past medical history, past social history, past surgical history and problem list.    Physical  Exam   Constitutional: He is oriented to person, place, and time. He appears well-developed and well-nourished.   HENT:   Head: Normocephalic and atraumatic.   Right Ear: External ear normal. Tympanic membrane is bulging.   Left Ear: External ear normal. Tympanic membrane is bulging.   Mouth/Throat: Posterior oropharyngeal erythema present. No oropharyngeal exudate or tonsillar abscesses.   Eyes: Pupils are equal, round, and reactive to light. Conjunctivae are normal.   Neck: Normal range of motion. Neck supple. No thyromegaly present.   Cardiovascular: Normal rate, regular rhythm and intact distal pulses.    No murmur heard.  Pulmonary/Chest: Effort normal and breath sounds normal. No stridor. He has no wheezes. He has no rales.   Abdominal: Soft. Bowel sounds are normal. He exhibits no distension and no mass. There is no tenderness. There is no rebound.   Musculoskeletal: He exhibits no edema.   Lymphadenopathy:     He has no cervical adenopathy.   Neurological: He is alert and oriented to person, place, and time. He displays normal reflexes. No cranial nerve deficit.   Skin: Skin is warm and dry.   Psychiatric: He has a normal mood and affect. His behavior is normal. Judgment and thought content normal.   Nursing note and vitals reviewed.      Results for orders placed or performed in visit on 08/14/18   Basic Metabolic Panel   Result Value Ref Range    Glucose 95 70 - 100 mg/dL    BUN 9 9 - 23 mg/dL    Creatinine 0.86 0.60 - 1.30 mg/dL    Sodium 139 132 - 146 mmol/L    Potassium 4.0 3.5 - 5.5 mmol/L    Chloride 105 99 - 109 mmol/L    CO2 27.0 20.0 - 31.0 mmol/L    Calcium 9.8 8.7 - 10.4 mg/dL    eGFR  African Amer 118 >60 mL/min/1.73    eGFR Non African Amer 98 >60 mL/min/1.73    BUN/Creatinine Ratio 10.5 7.0 - 25.0    Anion Gap 7.0 3.0 - 11.0 mmol/L   POCT urinalysis dipstick, automated   Result Value Ref Range    Color Yellow Yellow, Straw, Dark Yellow, Rylee    Clarity, UA Clear Clear    Specific [place]   1.020 1.005 - 1.030    pH, Urine 5.0 5.0 - 8.0    Leukocytes Negative Negative    Nitrite, UA Negative Negative    Protein, POC Negative Negative mg/dL    Glucose, UA Negative Negative, 1000 mg/dL (3+) mg/dL    Ketones, UA Negative Negative    Urobilinogen, UA Normal Normal    Bilirubin Negative Negative    Blood, UA Trace (A) Negative   POCT Glucose   Result Value Ref Range    Glucose 149 (A) 70 - 130 mg/dL   POC Glycosylated Hemoglobin (Hb A1C)   Result Value Ref Range    Hemoglobin A1C 6.2 %       Jose was seen today for annual exam.    Diagnoses and all orders for this visit:    Annual physical exam  -     POCT urinalysis dipstick, automated    Prediabetes  Comments:  A1C- 6.3 in June  Orders:  -     POCT Glucose  -     POC Glycosylated Hemoglobin (Hb A1C)    Essential hypertension  Comments:  check labs on the telmisartan  Orders:  -     Discontinue: telmisartan (MICARDIS) 20 MG tablet; Take 1 tablet by mouth Daily.  -     telmisartan (MICARDIS) 20 MG tablet; Take 1 tablet by mouth Daily.  -     Basic Metabolic Panel    Chronic seasonal allergic rhinitis due to other allergen  Comments:  xyzal prn  Orders:  -     levocetirizine (XYZAL) 5 MG tablet; Take 1 tablet by mouth Every Evening.    Other specified hypothyroidism  Comments:  tsh 3.7 on labs in June        Health Maintenance   Topic Date Due   • TDAP/TD VACCINES (1 - Tdap) 07/01/1995   • INFLUENZA VACCINE  08/01/2018   • LIPID PANEL  02/15/2019   • ANNUAL PHYSICAL  08/15/2019       Discussion/Summary  Impression: health maintenance visit, healthy adult male.   Currently, he eats a healthy diet and has an adequate exercise regimen.   Prostate cancer screening: PSA was ordered.   Testicular cancer screening: monthly self testicular exam was advised.   Colorectal cancer screening: fecal occult blood testing is needed every year and colonoscopy .   Screening lab work includes glucose, lipid profile and 25-hydroxyvitamin D.   The immunizations are up to date.    Advice and education were given regarding cardiovascular risk reduction and self skin examination.   Patient discussion: discussed with the patient.     Return in about 6 months (around 2/14/2019) for Next scheduled follow up.

## 2018-08-22 DIAGNOSIS — E03.8 OTHER SPECIFIED HYPOTHYROIDISM: ICD-10-CM

## 2018-08-22 RX ORDER — LEVOTHYROXINE SODIUM 0.05 MG/1
50 TABLET ORAL DAILY
Qty: 90 TABLET | Refills: 1 | Status: SHIPPED | OUTPATIENT
Start: 2018-08-22 | End: 2018-11-30 | Stop reason: SDUPTHER

## 2018-08-22 NOTE — TELEPHONE ENCOUNTER
PT'S WIFE CALLED AND PT NEEDS REFILL ON LEVOTHYROXINE; PLEASE SEND TO [facility] ON [address] [phone]

## 2018-08-27 ENCOUNTER — PATIENT MESSAGE (OUTPATIENT)
Dept: INTERNAL MEDICINE | Facility: CLINIC | Age: 43
End: 2018-08-27

## 2018-08-27 DIAGNOSIS — R73.03 PREDIABETES: ICD-10-CM

## 2018-08-28 RX ORDER — METFORMIN HYDROCHLORIDE 500 MG/1
500 TABLET, EXTENDED RELEASE ORAL
Qty: 90 TABLET | Refills: 2 | Status: SHIPPED | OUTPATIENT
Start: 2018-08-28 | End: 2019-02-18 | Stop reason: SDUPTHER

## 2018-08-30 ENCOUNTER — TELEPHONE (OUTPATIENT)
Dept: INTERNAL MEDICINE | Facility: CLINIC | Age: 43
End: 2018-08-30

## 2018-08-30 NOTE — TELEPHONE ENCOUNTER
PHARM HAS REQUESTED THE METFORMIN TO BE SENT OVER AS A 90 SUPPLY INSTEAD OF A 30 DAY SUPPLY WITH REFILLS.   INSURANCE REQUIRES A 90 DAYS SUPPLY IN ORDER FOR IT TO BE COVERED.

## 2018-08-30 NOTE — TELEPHONE ENCOUNTER
Informed pharmacy that the 90 day supply was already sent in on 8/28/18, stated verbal understanding.

## 2018-09-12 ENCOUNTER — CLINICAL SUPPORT (OUTPATIENT)
Dept: INTERNAL MEDICINE | Facility: CLINIC | Age: 43
End: 2018-09-12

## 2018-09-12 PROCEDURE — 90471 IMMUNIZATION ADMIN: CPT | Performed by: INTERNAL MEDICINE

## 2018-09-12 PROCEDURE — 90674 CCIIV4 VAC NO PRSV 0.5 ML IM: CPT | Performed by: INTERNAL MEDICINE

## 2018-11-28 DIAGNOSIS — I10 ESSENTIAL HYPERTENSION: ICD-10-CM

## 2018-11-28 RX ORDER — TELMISARTAN 20 MG/1
20 TABLET ORAL DAILY
Qty: 30 TABLET | Refills: 5 | Status: SHIPPED | OUTPATIENT
Start: 2018-11-28 | End: 2018-11-30 | Stop reason: SDUPTHER

## 2018-11-30 DIAGNOSIS — I10 ESSENTIAL HYPERTENSION: ICD-10-CM

## 2018-11-30 DIAGNOSIS — E03.8 OTHER SPECIFIED HYPOTHYROIDISM: ICD-10-CM

## 2018-11-30 RX ORDER — LEVOTHYROXINE SODIUM 0.05 MG/1
50 TABLET ORAL DAILY
Qty: 90 TABLET | Refills: 1 | Status: SHIPPED | OUTPATIENT
Start: 2018-11-30 | End: 2019-02-18 | Stop reason: SDUPTHER

## 2018-11-30 RX ORDER — TELMISARTAN 20 MG/1
20 TABLET ORAL DAILY
Qty: 90 TABLET | Refills: 1 | Status: SHIPPED | OUTPATIENT
Start: 2018-11-30 | End: 2020-08-19 | Stop reason: SDUPTHER

## 2018-12-13 ENCOUNTER — TELEPHONE (OUTPATIENT)
Dept: INTERNAL MEDICINE | Facility: CLINIC | Age: 43
End: 2018-12-13

## 2018-12-13 ENCOUNTER — OFFICE VISIT (OUTPATIENT)
Dept: INTERNAL MEDICINE | Facility: CLINIC | Age: 43
End: 2018-12-13

## 2018-12-13 VITALS
WEIGHT: 206 LBS | DIASTOLIC BLOOD PRESSURE: 82 MMHG | OXYGEN SATURATION: 98 % | HEIGHT: 73 IN | BODY MASS INDEX: 27.3 KG/M2 | SYSTOLIC BLOOD PRESSURE: 118 MMHG | TEMPERATURE: 98.2 F | HEART RATE: 96 BPM

## 2018-12-13 DIAGNOSIS — R68.89 FLU-LIKE SYMPTOMS: ICD-10-CM

## 2018-12-13 DIAGNOSIS — J11.1 INFLUENZA: ICD-10-CM

## 2018-12-13 DIAGNOSIS — J01.10 ACUTE FRONTAL SINUSITIS, RECURRENCE NOT SPECIFIED: Primary | ICD-10-CM

## 2018-12-13 LAB
EXPIRATION DATE: NORMAL
FLUAV AG NPH QL: NORMAL
FLUBV AG NPH QL: NORMAL
INTERNAL CONTROL: NORMAL
Lab: NORMAL

## 2018-12-13 PROCEDURE — 87804 INFLUENZA ASSAY W/OPTIC: CPT | Performed by: NURSE PRACTITIONER

## 2018-12-13 PROCEDURE — 99214 OFFICE O/P EST MOD 30 MIN: CPT | Performed by: NURSE PRACTITIONER

## 2018-12-13 RX ORDER — OSELTAMIVIR PHOSPHATE 75 MG/1
75 CAPSULE ORAL 2 TIMES DAILY
Qty: 10 CAPSULE | Refills: 0 | Status: SHIPPED | OUTPATIENT
Start: 2018-12-13 | End: 2018-12-18

## 2018-12-13 RX ORDER — FLUTICASONE PROPIONATE 50 MCG
2 SPRAY, SUSPENSION (ML) NASAL DAILY
Qty: 1 BOTTLE | Refills: 0 | Status: SHIPPED | OUTPATIENT
Start: 2018-12-13

## 2018-12-13 RX ORDER — AMOXICILLIN 875 MG/1
875 TABLET, COATED ORAL EVERY 12 HOURS SCHEDULED
Qty: 20 TABLET | Refills: 0 | Status: SHIPPED | OUTPATIENT
Start: 2018-12-13 | End: 2018-12-23

## 2018-12-13 RX ORDER — LEVOCETIRIZINE DIHYDROCHLORIDE 5 MG/1
5 TABLET, FILM COATED ORAL EVERY EVENING
Qty: 30 TABLET | Refills: 0 | Status: SHIPPED | OUTPATIENT
Start: 2018-12-13 | End: 2020-08-19 | Stop reason: SDUPTHER

## 2018-12-13 NOTE — TELEPHONE ENCOUNTER
PATIENT IS REQUESTING TO SEE DR CONROY TODAY FOR SX OF HEADACHE/BODY ACHES FOR SEVERAL DAYS. EXPLAINED TO PATIENT THAT A MESSAGE WOULD BE SENT TO SEE IF DR CONROY CAN WORK HIM IN OR HAVE HIM SCHEDULE WITH AN EXTENDER.    CALL BACK [phone]

## 2018-12-13 NOTE — PROGRESS NOTES
"CHIEF COMPLAINT  Chief Complaint   Patient presents with   • Headache   • Generalized Body Aches   • Nausea   • Fatigue   • Chills       HPI  Jose Cobos is a 43 y.o. male  presents with complaint of URI symptoms x 2 days.    Sudden onset of sore throat, sinus pressure/pain, PND, ear fullness/pain, body aches, mild nausea, mild cough, fatigue, decreased appetite, headache    Review of Systems   Denies fever, cough, chest tightness, wheezing, SOA, v/d, abdominal pain, urinary  symptoms    Pertinent ROS noted in HPI    Past Medical History:   Diagnosis Date   • Diabetes mellitus (CMS/HCC)    • Hypertension    • Thyroid disease        Family History   Problem Relation Age of Onset   • Hypertension Mother    • Diabetes Father    • Hypertension Father    • Alzheimer's disease Father        Social History     Socioeconomic History   • Marital status:      Spouse name: Not on file   • Number of children: Not on file   • Years of education: Not on file   • Highest education level: Not on file   Social Needs   • Financial resource strain: Not on file   • Food insecurity - worry: Not on file   • Food insecurity - inability: Not on file   • Transportation needs - medical: Not on file   • Transportation needs - non-medical: Not on file   Occupational History   • Not on file   Tobacco Use   • Smoking status: Never Smoker   • Smokeless tobacco: Never Used   Substance and Sexual Activity   • Alcohol use: Yes     Comment: occasionally   • Drug use: Not on file   • Sexual activity: Not on file   Other Topics Concern   • Not on file   Social History Narrative   • Not on file         /82   Pulse 96   Temp 98.2 °F (36.8 °C)   Ht 185.4 cm (73\")   Wt 93.4 kg (206 lb)   SpO2 98%   BMI 27.18 kg/m²     PHYSICAL EXAM  Physical Exam   Constitutional: He is oriented to person, place, and time. He appears well-developed and well-nourished.   HENT:   Head: Normocephalic and atraumatic.   -bilat TMs are bulging with cloudy " effusions, , no erythema  -erythema of bilateral EACs, no swelling or tragal tenderness  -nasal mucosa is erythematous and swollen  -severe left frontal and ethmoidal sinus tenderness  -oropharynx is erythematous with large amt of thick PND, cobblestoning, no exudate     Eyes: Conjunctivae are normal.   Neck: Trachea normal and normal range of motion. Neck supple. No JVD present. No thyromegaly present.   Cardiovascular: Normal rate, regular rhythm and normal heart sounds.   No murmur heard.  No swelling in BLE   Pulmonary/Chest: Effort normal and breath sounds normal.   Lymphadenopathy:   Bilateral anterior cervical adenopathy with tenderness   Neurological: He is alert and oriented to person, place, and time.   Skin: Skin is warm, dry and intact.   Vitals reviewed.      Results for orders placed or performed in visit on 12/13/18   POCT Influenza A/B   Result Value Ref Range    Rapid Influenza A Ag NEG Negative    Rapid Influenza B Ag NEG Negative    Internal Control Passed Passed    Lot Number [id number]     Expiration Date 11/8/20        ASSESSMENT/PLAN  1. Acute frontal sinusitis, recurrence not specified  - amoxicillin (AMOXIL) 875 MG tablet; Take 1 tablet by mouth Every 12 (Twelve) Hours for 10 days.  Dispense: 20 tablet; Refill: 0  - levocetirizine (XYZAL) 5 MG tablet; Take 1 tablet by mouth Every Evening.  Dispense: 30 tablet; Refill: 0  - fluticasone (FLONASE) 50 MCG/ACT nasal spray; 2 sprays into the nostril(s) as directed by provider Daily.  Dispense: 1 bottle; Refill: 0    2. Influenza  - oseltamivir (TAMIFLU) 75 MG capsule; Take 1 capsule by mouth 2 (Two) Times a Day for 5 days.  Dispense: 10 capsule; Refill: 0    3. Flu-like symptoms  - POCT Influenza A/B      Plan: course of antibiotics for sinus infection; also start daily antihistamine and flonase nasal spray; although flu test is negative for A and B, presentation and onset of symptoms, will treat with Tamiflu; she is instructed to continue Dayquil  and Nyquil if helping to relieve symptoms, increase fluid intake, rest prn; if no improvement in 4-5 days, should return for further evaluation      FOLLOW-UP  Next scheduled f/u with Dr. Regalado; sooner as needed if no improvement or worsening of symptoms    Patient verbalizes understanding of medication dosage, comfort measures, instructions for treatment and follow-up.    Noemí Robertson, APRN  12/13/2018  12:23 PM

## 2019-02-18 ENCOUNTER — OFFICE VISIT (OUTPATIENT)
Dept: INTERNAL MEDICINE | Facility: CLINIC | Age: 44
End: 2019-02-18

## 2019-02-18 VITALS
SYSTOLIC BLOOD PRESSURE: 128 MMHG | HEART RATE: 70 BPM | WEIGHT: 210 LBS | HEIGHT: 73 IN | BODY MASS INDEX: 27.83 KG/M2 | DIASTOLIC BLOOD PRESSURE: 70 MMHG | OXYGEN SATURATION: 99 %

## 2019-02-18 DIAGNOSIS — R73.03 PREDIABETES: ICD-10-CM

## 2019-02-18 DIAGNOSIS — R00.2 PALPITATIONS: ICD-10-CM

## 2019-02-18 DIAGNOSIS — I10 ESSENTIAL HYPERTENSION: ICD-10-CM

## 2019-02-18 DIAGNOSIS — E78.5 HYPERLIPIDEMIA, UNSPECIFIED HYPERLIPIDEMIA TYPE: ICD-10-CM

## 2019-02-18 DIAGNOSIS — E03.8 OTHER SPECIFIED HYPOTHYROIDISM: Primary | ICD-10-CM

## 2019-02-18 LAB
ALBUMIN SERPL-MCNC: 5.03 G/DL (ref 3.2–4.8)
ALBUMIN/GLOB SERPL: 2 G/DL (ref 1.5–2.5)
ALP SERPL-CCNC: 92 U/L (ref 25–100)
ALT SERPL W P-5'-P-CCNC: 66 U/L (ref 7–40)
ANION GAP SERPL CALCULATED.3IONS-SCNC: 8 MMOL/L (ref 3–11)
ARTICHOKE IGE QN: 174 MG/DL (ref 0–130)
AST SERPL-CCNC: 46 U/L (ref 0–33)
BILIRUB SERPL-MCNC: 0.8 MG/DL (ref 0.3–1.2)
BUN BLD-MCNC: 7 MG/DL (ref 9–23)
BUN/CREAT SERPL: 7.8 (ref 7–25)
CALCIUM SPEC-SCNC: 10.5 MG/DL (ref 8.7–10.4)
CHLORIDE SERPL-SCNC: 101 MMOL/L (ref 99–109)
CHOLEST SERPL-MCNC: 224 MG/DL (ref 0–200)
CO2 SERPL-SCNC: 28 MMOL/L (ref 20–31)
CREAT BLD-MCNC: 0.9 MG/DL (ref 0.6–1.3)
GFR SERPL CREATININE-BSD FRML MDRD: 112 ML/MIN/1.73
GFR SERPL CREATININE-BSD FRML MDRD: 93 ML/MIN/1.73
GLOBULIN UR ELPH-MCNC: 2.6 GM/DL
GLUCOSE BLD-MCNC: 109 MG/DL (ref 70–100)
HBA1C MFR BLD: 6 % (ref 4.8–5.6)
HDLC SERPL-MCNC: 47 MG/DL (ref 40–60)
POTASSIUM BLD-SCNC: 4.7 MMOL/L (ref 3.5–5.5)
PROT SERPL-MCNC: 7.6 G/DL (ref 5.7–8.2)
SODIUM BLD-SCNC: 137 MMOL/L (ref 132–146)
T4 FREE SERPL-MCNC: 1.46 NG/DL (ref 0.89–1.76)
TRIGL SERPL-MCNC: 94 MG/DL (ref 0–150)
TSH SERPL DL<=0.05 MIU/L-ACNC: 1.97 MIU/ML (ref 0.35–5.35)

## 2019-02-18 PROCEDURE — 99214 OFFICE O/P EST MOD 30 MIN: CPT | Performed by: INTERNAL MEDICINE

## 2019-02-18 PROCEDURE — 84439 ASSAY OF FREE THYROXINE: CPT | Performed by: INTERNAL MEDICINE

## 2019-02-18 PROCEDURE — 83036 HEMOGLOBIN GLYCOSYLATED A1C: CPT | Performed by: INTERNAL MEDICINE

## 2019-02-18 PROCEDURE — 80061 LIPID PANEL: CPT | Performed by: INTERNAL MEDICINE

## 2019-02-18 PROCEDURE — 80053 COMPREHEN METABOLIC PANEL: CPT | Performed by: INTERNAL MEDICINE

## 2019-02-18 PROCEDURE — 84443 ASSAY THYROID STIM HORMONE: CPT | Performed by: INTERNAL MEDICINE

## 2019-02-18 RX ORDER — LEVOTHYROXINE SODIUM 0.05 MG/1
50 TABLET ORAL DAILY
Qty: 90 TABLET | Refills: 1 | Status: SHIPPED | OUTPATIENT
Start: 2019-02-18 | End: 2019-12-30 | Stop reason: SDUPTHER

## 2019-02-18 RX ORDER — METFORMIN HYDROCHLORIDE 500 MG/1
500 TABLET, EXTENDED RELEASE ORAL
Qty: 90 TABLET | Refills: 2 | Status: SHIPPED | OUTPATIENT
Start: 2019-02-18 | End: 2019-12-30 | Stop reason: SDUPTHER

## 2019-02-18 NOTE — PROGRESS NOTES
Follow-up (pt is fasting)    Subjective   Jose Cobos is a 43 y.o. male is here today for follow-up.    History of Present Illness   Here for a follow up on his DM2, Hypothyrid , HTN, and palps.  He notes occasional episodes of light headedness, and palps.  Previously had had syncopal episodes    Current Outpatient Medications:   •  Ascorbic Acid (VITAMIN C) 100 MG chewable tablet, Vitamin C  1 daily, Disp: , Rfl:   •  Cholecalciferol (VITAMIN D) 2000 units capsule, Take  by mouth Daily., Disp: , Rfl:   •  fluticasone (FLONASE) 50 MCG/ACT nasal spray, 2 sprays into the nostril(s) as directed by provider Daily., Disp: 1 bottle, Rfl: 0  •  levocetirizine (XYZAL) 5 MG tablet, Take 1 tablet by mouth Every Evening., Disp: 30 tablet, Rfl: 0  •  levothyroxine (SYNTHROID, LEVOTHROID) 50 MCG tablet, Take 1 tablet by mouth Daily., Disp: 90 tablet, Rfl: 1  •  metFORMIN ER (GLUCOPHAGE XR) 500 MG 24 hr tablet, Take 1 tablet by mouth Daily With Breakfast., Disp: 90 tablet, Rfl: 2  •  Multiple Vitamin (MULTI VITAMIN DAILY PO), Take  by mouth Daily., Disp: , Rfl:   •  Omega-3 Fatty Acids (FISH OIL) 1000 MG capsule capsule, Take 1,000 mg by mouth 2 (Two) Times a Day With Meals., Disp: , Rfl:   •  telmisartan (MICARDIS) 20 MG tablet, Take 1 tablet by mouth Daily., Disp: 90 tablet, Rfl: 1      The following portions of the patient's history were reviewed and updated as appropriate: allergies, current medications, past family history, past medical history, past social history, past surgical history and problem list.    Review of Systems   Constitutional: Negative.  Negative for chills and fever.   HENT: Negative for ear discharge, ear pain, sinus pressure and sore throat.    Respiratory: Negative for cough, chest tightness and shortness of breath.    Cardiovascular: Positive for palpitations. Negative for chest pain and leg swelling.   Gastrointestinal: Negative for diarrhea, nausea and vomiting.   Musculoskeletal: Negative for  "arthralgias, back pain and myalgias.   Neurological: Positive for light-headedness. Negative for dizziness, syncope and headaches.   Psychiatric/Behavioral: Negative for confusion and sleep disturbance.       Objective   /70   Pulse 70   Ht 185.4 cm (73\")   Wt 95.3 kg (210 lb)   SpO2 99%   BMI 27.71 kg/m²   Physical Exam   Constitutional: He is oriented to person, place, and time. He appears well-developed and well-nourished.   HENT:   Head: Normocephalic and atraumatic.   Right Ear: External ear normal.   Left Ear: External ear normal.   Mouth/Throat: No oropharyngeal exudate.   Eyes: Conjunctivae are normal. Pupils are equal, round, and reactive to light.   Neck: Neck supple. No thyromegaly present.   Cardiovascular: Normal rate and regular rhythm.   Pulmonary/Chest: Effort normal and breath sounds normal.   Musculoskeletal: He exhibits no edema.   Neurological: He is alert and oriented to person, place, and time. No cranial nerve deficit.   Skin: Skin is warm and dry.   Psychiatric: He has a normal mood and affect. Judgment normal.   Nursing note and vitals reviewed.        Results for orders placed or performed in visit on 02/18/19   Comprehensive Metabolic Panel   Result Value Ref Range    Glucose 109 (H) 70 - 100 mg/dL    BUN 7 (L) 9 - 23 mg/dL    Creatinine 0.90 0.60 - 1.30 mg/dL    Sodium 137 132 - 146 mmol/L    Potassium 4.7 3.5 - 5.5 mmol/L    Chloride 101 99 - 109 mmol/L    CO2 28.0 20.0 - 31.0 mmol/L    Calcium 10.5 (H) 8.7 - 10.4 mg/dL    Total Protein 7.6 5.7 - 8.2 g/dL    Albumin 5.03 (H) 3.20 - 4.80 g/dL    ALT (SGPT) 66 (H) 7 - 40 U/L    AST (SGOT) 46 (H) 0 - 33 U/L    Alkaline Phosphatase 92 25 - 100 U/L    Total Bilirubin 0.8 0.3 - 1.2 mg/dL    eGFR Non African Amer 93 >60 mL/min/1.73    eGFR  African Amer 112 >60 mL/min/1.73    Globulin 2.6 gm/dL    A/G Ratio 2.0 1.5 - 2.5 g/dL    BUN/Creatinine Ratio 7.8 7.0 - 25.0    Anion Gap 8.0 3.0 - 11.0 mmol/L   Lipid Panel   Result Value Ref " Range    Total Cholesterol 224 (H) 0 - 200 mg/dL    Triglycerides 94 0 - 150 mg/dL    HDL Cholesterol 47 40 - 60 mg/dL    LDL Cholesterol  174 (H) 0 - 130 mg/dL   TSH   Result Value Ref Range    TSH 1.968 0.350 - 5.350 mIU/mL   T4, Free   Result Value Ref Range    Free T4 1.46 0.89 - 1.76 ng/dL   Hemoglobin A1c   Result Value Ref Range    Hemoglobin A1C 6.00 (H) 4.80 - 5.60 %             Assessment/Plan   Diagnoses and all orders for this visit:    Other specified hypothyroidism  -     TSH  -     T4, Free  -     levothyroxine (SYNTHROID, LEVOTHROID) 50 MCG tablet; Take 1 tablet by mouth Daily.    Hyperlipidemia, unspecified hyperlipidemia type  -     Comprehensive Metabolic Panel  -     Lipid Panel    Essential hypertension  -     Comprehensive Metabolic Panel    Prediabetes  -     Hemoglobin A1c  -     metFORMIN ER (GLUCOPHAGE XR) 500 MG 24 hr tablet; Take 1 tablet by mouth Daily With Breakfast.    Palpitations  -     Holter Monitor - 72 Hour Up To 21 Days    Essential hypertension  Comments:  check labs on the telmisartan  Orders:  -     Comprehensive Metabolic Panel    check clevelando as multiple ekgs okay, and he does have left atrial enlargement per Echo done last June, rule out a-fib episodes.             Return in about 6 months (around 8/18/2019) for Annual.

## 2019-02-21 ENCOUNTER — TELEPHONE (OUTPATIENT)
Dept: INTERNAL MEDICINE | Facility: CLINIC | Age: 44
End: 2019-02-21

## 2019-02-21 NOTE — TELEPHONE ENCOUNTER
Felicia with the [facility] called to say that the pt called her to get a PA for the zio patch -she does not do these. Do you know anything about this?    Please call her back [phone]

## 2019-08-19 ENCOUNTER — OFFICE VISIT (OUTPATIENT)
Dept: INTERNAL MEDICINE | Facility: CLINIC | Age: 44
End: 2019-08-19

## 2019-08-19 VITALS
OXYGEN SATURATION: 97 % | TEMPERATURE: 98.3 F | HEART RATE: 79 BPM | BODY MASS INDEX: 27.51 KG/M2 | SYSTOLIC BLOOD PRESSURE: 118 MMHG | WEIGHT: 207.6 LBS | DIASTOLIC BLOOD PRESSURE: 78 MMHG | HEIGHT: 73 IN

## 2019-08-19 DIAGNOSIS — R73.03 PREDIABETES: ICD-10-CM

## 2019-08-19 DIAGNOSIS — L98.9 BENIGN SKIN LESION OF NECK: ICD-10-CM

## 2019-08-19 DIAGNOSIS — Z00.00 ANNUAL PHYSICAL EXAM: Primary | ICD-10-CM

## 2019-08-19 DIAGNOSIS — E78.5 HYPERLIPIDEMIA, UNSPECIFIED HYPERLIPIDEMIA TYPE: ICD-10-CM

## 2019-08-19 DIAGNOSIS — E04.1 THYROID NODULE: ICD-10-CM

## 2019-08-19 DIAGNOSIS — I10 ESSENTIAL HYPERTENSION: ICD-10-CM

## 2019-08-19 DIAGNOSIS — E03.8 OTHER SPECIFIED HYPOTHYROIDISM: ICD-10-CM

## 2019-08-19 LAB
25(OH)D3 SERPL-MCNC: 59.4 NG/ML (ref 30–100)
ALBUMIN SERPL-MCNC: 5.1 G/DL (ref 3.5–5.2)
ALBUMIN/GLOB SERPL: 1.6 G/DL
ALP SERPL-CCNC: 101 U/L (ref 39–117)
ALT SERPL W P-5'-P-CCNC: 48 U/L (ref 1–41)
ANION GAP SERPL CALCULATED.3IONS-SCNC: 18.2 MMOL/L (ref 5–15)
AST SERPL-CCNC: 34 U/L (ref 1–40)
BILIRUB BLD-MCNC: NEGATIVE MG/DL
BILIRUB SERPL-MCNC: 0.6 MG/DL (ref 0.2–1.2)
BUN BLD-MCNC: 7 MG/DL (ref 6–20)
BUN/CREAT SERPL: 7.9 (ref 7–25)
CALCIUM SPEC-SCNC: 10.2 MG/DL (ref 8.6–10.5)
CHLORIDE SERPL-SCNC: 98 MMOL/L (ref 98–107)
CHOLEST SERPL-MCNC: 167 MG/DL (ref 0–200)
CLARITY, POC: CLEAR
CO2 SERPL-SCNC: 23.8 MMOL/L (ref 22–29)
COLOR UR: YELLOW
CREAT BLD-MCNC: 0.89 MG/DL (ref 0.76–1.27)
DEPRECATED RDW RBC AUTO: 43 FL (ref 37–54)
ERYTHROCYTE [DISTWIDTH] IN BLOOD BY AUTOMATED COUNT: 13.2 % (ref 12.3–15.4)
GFR SERPL CREATININE-BSD FRML MDRD: 113 ML/MIN/1.73
GFR SERPL CREATININE-BSD FRML MDRD: 93 ML/MIN/1.73
GLOBULIN UR ELPH-MCNC: 3.2 GM/DL
GLUCOSE BLD-MCNC: 106 MG/DL (ref 65–99)
GLUCOSE UR STRIP-MCNC: NEGATIVE MG/DL
HBA1C MFR BLD: 6 % (ref 4.8–5.6)
HCT VFR BLD AUTO: 51 % (ref 37.5–51)
HDLC SERPL-MCNC: 50 MG/DL (ref 40–60)
HGB BLD-MCNC: 16 G/DL (ref 13–17.7)
KETONES UR QL: NEGATIVE
LDLC SERPL CALC-MCNC: 98 MG/DL (ref 0–100)
LDLC/HDLC SERPL: 1.96 {RATIO}
LEUKOCYTE EST, POC: NEGATIVE
MCH RBC QN AUTO: 28.3 PG (ref 26.6–33)
MCHC RBC AUTO-ENTMCNC: 31.4 G/DL (ref 31.5–35.7)
MCV RBC AUTO: 90.1 FL (ref 79–97)
NITRITE UR-MCNC: NEGATIVE MG/ML
PH UR: 5 [PH] (ref 5–8)
PLATELET # BLD AUTO: 237 10*3/MM3 (ref 140–450)
PMV BLD AUTO: 12.6 FL (ref 6–12)
POTASSIUM BLD-SCNC: 4.4 MMOL/L (ref 3.5–5.2)
PROT SERPL-MCNC: 8.3 G/DL (ref 6–8.5)
PROT UR STRIP-MCNC: NEGATIVE MG/DL
RBC # BLD AUTO: 5.66 10*6/MM3 (ref 4.14–5.8)
RBC # UR STRIP: NEGATIVE /UL
SODIUM BLD-SCNC: 140 MMOL/L (ref 136–145)
SP GR UR: 1 (ref 1–1.03)
T4 FREE SERPL-MCNC: 1.36 NG/DL (ref 0.93–1.7)
TRIGL SERPL-MCNC: 96 MG/DL (ref 0–150)
TSH SERPL DL<=0.05 MIU/L-ACNC: 1.92 MIU/ML (ref 0.27–4.2)
UROBILINOGEN UR QL: NORMAL
VLDLC SERPL-MCNC: 19.2 MG/DL (ref 5–40)
WBC NRBC COR # BLD: 9.98 10*3/MM3 (ref 3.4–10.8)

## 2019-08-19 PROCEDURE — 80061 LIPID PANEL: CPT | Performed by: INTERNAL MEDICINE

## 2019-08-19 PROCEDURE — 85027 COMPLETE CBC AUTOMATED: CPT | Performed by: INTERNAL MEDICINE

## 2019-08-19 PROCEDURE — 81003 URINALYSIS AUTO W/O SCOPE: CPT | Performed by: INTERNAL MEDICINE

## 2019-08-19 PROCEDURE — 82306 VITAMIN D 25 HYDROXY: CPT | Performed by: INTERNAL MEDICINE

## 2019-08-19 PROCEDURE — 83036 HEMOGLOBIN GLYCOSYLATED A1C: CPT | Performed by: INTERNAL MEDICINE

## 2019-08-19 PROCEDURE — 80053 COMPREHEN METABOLIC PANEL: CPT | Performed by: INTERNAL MEDICINE

## 2019-08-19 PROCEDURE — 93000 ELECTROCARDIOGRAM COMPLETE: CPT | Performed by: INTERNAL MEDICINE

## 2019-08-19 PROCEDURE — 84443 ASSAY THYROID STIM HORMONE: CPT | Performed by: INTERNAL MEDICINE

## 2019-08-19 PROCEDURE — 84439 ASSAY OF FREE THYROXINE: CPT | Performed by: INTERNAL MEDICINE

## 2019-08-19 PROCEDURE — 99396 PREV VISIT EST AGE 40-64: CPT | Performed by: INTERNAL MEDICINE

## 2019-08-19 NOTE — PROGRESS NOTES
Chief Complaint   Patient presents with   • Annual Exam       History of Present Illness  HM, Adult Male:  The patient is being seen for a health maintenance evaluation. The last health maintenance visit was 1 year(s) ago.   Social History: Household members include spouse. He is  with 2 children. Work status: working full time. The patient has never smoked cigarettes. He reports occ. drinking alcohol. He has never used illicit drugs.   General Health: The patient's health is described as good. He has regular dental visits. He denies vision problems. He denies hearing loss. Immunizations status: up to date.   Lifestyle:. He consumes a diverse and healthy diet. He does not have any weight concerns. He exercises regularly. He does not use tobacco. He reports occasional alcohol use. He denies drug use.   Screening: Cancer screening reviewed and current.   Metabolic screening reviewed and current.   Risk screening reviewed and current.     Notes small bumps n his neck and posterior scalp.  Also notes some thickness of the mucosa under his tongue.    Review of Systems   Constitutional: Negative for chills and fatigue.   HENT: Negative for congestion, ear pain and sore throat.    Eyes: Negative for pain, redness and visual disturbance.   Respiratory: Negative for cough and shortness of breath.    Cardiovascular: Negative for chest pain, palpitations and leg swelling.   Gastrointestinal: Negative for abdominal pain, diarrhea and nausea.   Endocrine: Negative for cold intolerance and heat intolerance.   Genitourinary: Negative for flank pain and urgency.   Musculoskeletal: Negative for arthralgias and gait problem.   Skin: Positive for rash (skin bumps on neck and scalp.). Negative for pallor.   Neurological: Negative for dizziness, weakness and headaches.   Psychiatric/Behavioral: Negative for dysphoric mood and sleep disturbance. The patient is not nervous/anxious.        Patient Active Problem List   Diagnosis   •  "Other specified hypothyroidism   • Hyperlipidemia   • Prediabetes   • Essential hypertension       Social History     Socioeconomic History   • Marital status:      Spouse name: Not on file   • Number of children: Not on file   • Years of education: Not on file   • Highest education level: Not on file   Tobacco Use   • Smoking status: Never Smoker   • Smokeless tobacco: Never Used   Substance and Sexual Activity   • Alcohol use: Yes     Comment: occasionally       Current Outpatient Medications on File Prior to Visit   Medication Sig Dispense Refill   • Ascorbic Acid (VITAMIN C) 100 MG chewable tablet Vitamin C   1 daily     • atorvastatin (LIPITOR) 10 MG tablet Take 1 tablet by mouth Daily. 30 tablet 5   • Cholecalciferol (VITAMIN D) 2000 units capsule Take  by mouth Daily.     • fluticasone (FLONASE) 50 MCG/ACT nasal spray 2 sprays into the nostril(s) as directed by provider Daily. 1 bottle 0   • levocetirizine (XYZAL) 5 MG tablet Take 1 tablet by mouth Every Evening. 30 tablet 0   • levothyroxine (SYNTHROID, LEVOTHROID) 50 MCG tablet Take 1 tablet by mouth Daily. 90 tablet 1   • metFORMIN ER (GLUCOPHAGE XR) 500 MG 24 hr tablet Take 1 tablet by mouth Daily With Breakfast. 90 tablet 2   • Multiple Vitamin (MULTI VITAMIN DAILY PO) Take  by mouth Daily.     • Omega-3 Fatty Acids (FISH OIL) 1000 MG capsule capsule Take 1,000 mg by mouth 2 (Two) Times a Day With Meals.     • telmisartan (MICARDIS) 20 MG tablet Take 1 tablet by mouth Daily. 90 tablet 1     No current facility-administered medications on file prior to visit.        No Known Allergies    /78   Pulse 79   Temp 98.3 °F (36.8 °C) (Oral)   Ht 185.4 cm (72.99\")   Wt 94.2 kg (207 lb 9.6 oz)   SpO2 97%   BMI 27.40 kg/m²          The following portions of the patient's history were reviewed and updated as appropriate: allergies, current medications, past family history, past medical history, past social history, past surgical history and " problem list.    Physical Exam   Constitutional: He is oriented to person, place, and time. He appears well-developed and well-nourished.   HENT:   Head: Normocephalic and atraumatic.   Right Ear: External ear normal.   Left Ear: External ear normal.   Mouth/Throat: No oropharyngeal exudate.   Eyes: Conjunctivae are normal. Pupils are equal, round, and reactive to light.   Neck: Neck supple. No thyromegaly present.   Cardiovascular: Normal rate, regular rhythm and intact distal pulses. Exam reveals no friction rub.   No murmur heard.  Pulmonary/Chest: Effort normal and breath sounds normal. No stridor. He has no wheezes. He has no rales.   Abdominal: Soft. Bowel sounds are normal. He exhibits no distension and no mass. There is no tenderness. There is no rebound.   Musculoskeletal: He exhibits no edema or tenderness.   Lymphadenopathy:     He has no cervical adenopathy.   Neurological: He is alert and oriented to person, place, and time. He displays normal reflexes. No cranial nerve deficit. He exhibits normal muscle tone. Coordination normal.   Skin: Skin is warm and dry. Rash (small healing bumps midline neck and 1 2-3 mm flashy nodule left upper neck, minimal erythema, not tender.) noted. No pallor.   Psychiatric: He has a normal mood and affect. His behavior is normal. Judgment and thought content normal.   Nursing note and vitals reviewed.      Results for orders placed or performed in visit on 08/19/19   CBC (No Diff)   Result Value Ref Range    WBC 9.98 3.40 - 10.80 10*3/mm3    RBC 5.66 4.14 - 5.80 10*6/mm3    Hemoglobin 16.0 13.0 - 17.7 g/dL    Hematocrit 51.0 37.5 - 51.0 %    MCV 90.1 79.0 - 97.0 fL    MCH 28.3 26.6 - 33.0 pg    MCHC 31.4 (L) 31.5 - 35.7 g/dL    RDW 13.2 12.3 - 15.4 %    RDW-SD 43.0 37.0 - 54.0 fl    MPV 12.6 (H) 6.0 - 12.0 fL    Platelets 237 140 - 450 10*3/mm3   Comprehensive Metabolic Panel   Result Value Ref Range    Glucose 106 (H) 65 - 99 mg/dL    BUN 7 6 - 20 mg/dL    Creatinine 0.89  0.76 - 1.27 mg/dL    Sodium 140 136 - 145 mmol/L    Potassium 4.4 3.5 - 5.2 mmol/L    Chloride 98 98 - 107 mmol/L    CO2 23.8 22.0 - 29.0 mmol/L    Calcium 10.2 8.6 - 10.5 mg/dL    Total Protein 8.3 6.0 - 8.5 g/dL    Albumin 5.10 3.50 - 5.20 g/dL    ALT (SGPT) 48 (H) 1 - 41 U/L    AST (SGOT) 34 1 - 40 U/L    Alkaline Phosphatase 101 39 - 117 U/L    Total Bilirubin 0.6 0.2 - 1.2 mg/dL    eGFR Non African Amer 93 >60 mL/min/1.73    eGFR  African Amer 113 >60 mL/min/1.73    Globulin 3.2 gm/dL    A/G Ratio 1.6 g/dL    BUN/Creatinine Ratio 7.9 7.0 - 25.0    Anion Gap 18.2 (H) 5.0 - 15.0 mmol/L   Hemoglobin A1c   Result Value Ref Range    Hemoglobin A1C 6.00 (H) 4.80 - 5.60 %   Lipid Panel   Result Value Ref Range    Total Cholesterol 167 0 - 200 mg/dL    Triglycerides 96 0 - 150 mg/dL    HDL Cholesterol 50 40 - 60 mg/dL    LDL Cholesterol  98 0 - 100 mg/dL    VLDL Cholesterol 19.2 5 - 40 mg/dL    LDL/HDL Ratio 1.96    TSH   Result Value Ref Range    TSH 1.920 0.270 - 4.200 mIU/mL   T4, Free   Result Value Ref Range    Free T4 1.36 0.93 - 1.70 ng/dL   Vitamin D 25 Hydroxy   Result Value Ref Range    25 Hydroxy, Vitamin D 59.4 30.0 - 100.0 ng/ml   POC Urinalysis Dipstick, Automated   Result Value Ref Range    Color Yellow Yellow, Straw, Dark Yellow, Rylee    Clarity, UA Clear Clear    Specific Gravity  1.005 1.005 - 1.030    pH, Urine 5.0 5.0 - 8.0    Leukocytes Negative Negative    Nitrite, UA Negative Negative    Protein, POC Negative Negative mg/dL    Glucose, UA Negative Negative, 1000 mg/dL (3+) mg/dL    Ketones, UA Negative Negative    Urobilinogen, UA Normal Normal    Bilirubin Negative Negative    Blood, UA Negative Negative     ECG 12 Lead  Date/Time: 8/19/2019 11:25 AM  Performed by: Mony Regalado MD  Authorized by: Mony Regalado MD   Comparison: compared with previous ECG from 5/1/2018  Similar to previous ECG  Rhythm: sinus rhythm  Rate: normal  Conduction: conduction normal  ST Segments: ST segments  normal  T Waves: T waves normal  QRS axis: normal  Other: no other findings    Clinical impression: normal ECG              Jose was seen today for annual exam.    Diagnoses and all orders for this visit:    Annual physical exam  -     CBC (No Diff)  -     Comprehensive Metabolic Panel  -     Hemoglobin A1c  -     Lipid Panel  -     TSH  -     T4, Free  -     Vitamin D 25 Hydroxy  -     US Thyroid  -     POC Urinalysis Dipstick, Automated    Hyperlipidemia, unspecified hyperlipidemia type  -     Comprehensive Metabolic Panel  -     Lipid Panel    Essential hypertension    Other specified hypothyroidism    Prediabetes  -     Hemoglobin A1c    Thyroid nodule  -     US Thyroid    Benign skin lesion of neck  Comments:  viral vs skin tag, currently asymptomatic, but if burning or more lesions, email, will send rx for acyclovir.    Other orders  -     ECG 12 Lead        Health Maintenance   Topic Date Due   • INFLUENZA VACCINE  08/01/2019   • LIPID PANEL  08/19/2020   • ANNUAL PHYSICAL  08/20/2020   • TDAP/TD VACCINES (2 - Td) 10/14/2022       Discussion/Summary  Impression: health maintenance visit, healthy adult male.   Currently, he eats a healthy diet and has an adequate exercise regimen.   Prostate cancer screening: PSA was not ordered. Start next year.  Testicular cancer screening: monthly self testicular exam was advised.   Colorectal cancer screening: fecal occult blood testing is needed every year and colonoscopy is due at 50.   Screening lab work includes glucose, lipid profile and 25-hydroxyvitamin D.   The immunizations are up to date.will check on hep a vaccine.   Advice and education were given regarding cardiovascular risk reduction, healthy dietary habits, Seatbelt and helmet use and self skin examination.        Return in about 6 months (around 2/19/2020) for Next scheduled follow up.

## 2019-08-26 ENCOUNTER — HOSPITAL ENCOUNTER (OUTPATIENT)
Dept: ULTRASOUND IMAGING | Facility: HOSPITAL | Age: 44
Discharge: HOME | End: 2019-08-26
Admitting: INTERNAL MEDICINE

## 2019-08-26 PROCEDURE — 76536 US EXAM OF HEAD AND NECK: CPT

## 2019-12-09 RX ORDER — OSELTAMIVIR PHOSPHATE 75 MG/1
75 CAPSULE ORAL DAILY
Qty: 10 CAPSULE | Refills: 0 | Status: SHIPPED | OUTPATIENT
Start: 2019-12-09 | End: 2020-08-19

## 2019-12-10 ENCOUNTER — OFFICE VISIT (OUTPATIENT)
Dept: INTERNAL MEDICINE | Facility: CLINIC | Age: 44
End: 2019-12-10

## 2019-12-10 VITALS
HEIGHT: 72 IN | TEMPERATURE: 98.1 F | WEIGHT: 205.4 LBS | SYSTOLIC BLOOD PRESSURE: 160 MMHG | DIASTOLIC BLOOD PRESSURE: 90 MMHG | OXYGEN SATURATION: 99 % | BODY MASS INDEX: 27.82 KG/M2 | HEART RATE: 98 BPM

## 2019-12-10 DIAGNOSIS — I10 ESSENTIAL HYPERTENSION: ICD-10-CM

## 2019-12-10 DIAGNOSIS — R07.89 ATYPICAL CHEST PAIN: ICD-10-CM

## 2019-12-10 DIAGNOSIS — J06.9 UPPER RESPIRATORY TRACT INFECTION, UNSPECIFIED TYPE: ICD-10-CM

## 2019-12-10 DIAGNOSIS — R05.9 COUGH: Primary | ICD-10-CM

## 2019-12-10 LAB
EXPIRATION DATE: NORMAL
FLUAV AG NPH QL: NEGATIVE
FLUBV AG NPH QL: NEGATIVE
INTERNAL CONTROL: NORMAL
Lab: NORMAL

## 2019-12-10 PROCEDURE — 99213 OFFICE O/P EST LOW 20 MIN: CPT | Performed by: INTERNAL MEDICINE

## 2019-12-10 PROCEDURE — 93000 ELECTROCARDIOGRAM COMPLETE: CPT | Performed by: INTERNAL MEDICINE

## 2019-12-10 PROCEDURE — 87804 INFLUENZA ASSAY W/OPTIC: CPT | Performed by: INTERNAL MEDICINE

## 2019-12-10 RX ORDER — AZITHROMYCIN 250 MG/1
TABLET, FILM COATED ORAL
Qty: 6 TABLET | Refills: 0 | Status: SHIPPED | OUTPATIENT
Start: 2019-12-10 | End: 2020-08-19

## 2019-12-10 NOTE — PROGRESS NOTES
Cough (with earache and flu exposure)    Subjective   Jose Cobos is a 44 y.o. male is here today for follow-up.    History of Present Illness   Some chest tightness Sunday, no cough, but burning eyes and ears.   Today, some sinus headache and chest tightness.   BP was 137/90 appx at home. Higher today.  Day before- pulse was 96.took theraflu last night , but not prior to that.    Current Outpatient Medications:   •  Ascorbic Acid (VITAMIN C) 100 MG chewable tablet, Vitamin C  1 daily, Disp: , Rfl:   •  atorvastatin (LIPITOR) 10 MG tablet, Take 1 tablet by mouth Daily., Disp: 30 tablet, Rfl: 5  •  Cholecalciferol (VITAMIN D) 2000 units capsule, Take  by mouth Daily., Disp: , Rfl:   •  fluticasone (FLONASE) 50 MCG/ACT nasal spray, 2 sprays into the nostril(s) as directed by provider Daily., Disp: 1 bottle, Rfl: 0  •  levocetirizine (XYZAL) 5 MG tablet, Take 1 tablet by mouth Every Evening., Disp: 30 tablet, Rfl: 0  •  levothyroxine (SYNTHROID, LEVOTHROID) 50 MCG tablet, Take 1 tablet by mouth Daily., Disp: 90 tablet, Rfl: 1  •  metFORMIN ER (GLUCOPHAGE XR) 500 MG 24 hr tablet, Take 1 tablet by mouth Daily With Breakfast., Disp: 90 tablet, Rfl: 2  •  Multiple Vitamin (MULTI VITAMIN DAILY PO), Take  by mouth Daily., Disp: , Rfl:   •  Omega-3 Fatty Acids (FISH OIL) 1000 MG capsule capsule, Take 1,000 mg by mouth 2 (Two) Times a Day With Meals., Disp: , Rfl:   •  oseltamivir (TAMIFLU) 75 MG capsule, Take 1 capsule by mouth Daily., Disp: 10 capsule, Rfl: 0  •  telmisartan (MICARDIS) 20 MG tablet, Take 1 tablet by mouth Daily., Disp: 90 tablet, Rfl: 1  •  azithromycin (ZITHROMAX) 250 MG tablet, Take 2 tablets the first day, then 1 tablet daily for 4 days., Disp: 6 tablet, Rfl: 0      The following portions of the patient's history were reviewed and updated as appropriate: allergies, current medications, past family history, past medical history, past social history, past surgical history and problem list.    Review  "of Systems   Constitutional: Negative.  Negative for chills and fever.   HENT: Positive for congestion, postnasal drip and sinus pressure. Negative for ear discharge, ear pain and sore throat.    Eyes: Positive for pain (burning).   Respiratory: Positive for chest tightness. Negative for cough and shortness of breath.    Cardiovascular: Negative for chest pain, palpitations and leg swelling.   Gastrointestinal: Negative for diarrhea, nausea and vomiting.   Musculoskeletal: Negative for arthralgias, back pain and myalgias.   Neurological: Negative for dizziness, syncope and headaches.   Psychiatric/Behavioral: Negative for confusion and sleep disturbance.       Objective   /90   Pulse 98   Temp 98.1 °F (36.7 °C)   Ht 182.9 cm (72\")   Wt 93.2 kg (205 lb 6.4 oz)   SpO2 99% Comment: ra  BMI 27.86 kg/m²   Physical Exam   Constitutional: He is oriented to person, place, and time. He appears well-developed and well-nourished.   HENT:   Head: Normocephalic and atraumatic.   Right Ear: Tympanic membrane is bulging.   Left Ear: Tympanic membrane is bulging.   Mouth/Throat: Posterior oropharyngeal erythema present. No oropharyngeal exudate or tonsillar abscesses.   Eyes: Pupils are equal, round, and reactive to light.   Neck: Normal range of motion.   Cardiovascular: Normal rate and regular rhythm.   Pulmonary/Chest: Effort normal and breath sounds normal. No stridor.   Abdominal: Soft. Bowel sounds are normal.   Lymphadenopathy:     He has no cervical adenopathy.   Neurological: He is alert and oriented to person, place, and time.   Skin: Skin is warm and dry.   Psychiatric: He has a normal mood and affect.         Results for orders placed or performed in visit on 12/10/19   POC Influenza A / B   Result Value Ref Range    Rapid Influenza A Ag Negative Negative    Rapid Influenza B Ag Negative Negative    Internal Control Passed Passed    Lot Number [id number]     Expiration Date 12/11/21          ECG 12 " Lead  Date/Time: 12/10/2019 11:24 AM  Performed by: Mony Regalado MD  Authorized by: Mony Regalado MD   Comparison: compared with previous ECG   Similar to previous ECG  Rhythm: sinus rhythm  Rate: normal  Conduction: conduction normal  Conduction: non-specific intraventricular conduction delay  ST Segments: ST segments normal  T Waves: T waves normal  QRS axis: normal  Other: no other findings    Clinical impression: normal ECG and non-specific ECG              Assessment/Plan   Diagnoses and all orders for this visit:    Cough  -     POC Influenza A / B    Essential hypertension  Comments:  high. increase micardis to bid.   Orders:  -     ECG 12 Lead    Upper respiratory tract infection, unspecified type  -     azithromycin (ZITHROMAX) 250 MG tablet; Take 2 tablets the first day, then 1 tablet daily for 4 days.    Atypical chest pain  -     ECG 12 Lead    -ve for flu.               Return for Next scheduled follow up.

## 2019-12-30 DIAGNOSIS — R73.03 PREDIABETES: ICD-10-CM

## 2019-12-30 DIAGNOSIS — E03.8 OTHER SPECIFIED HYPOTHYROIDISM: ICD-10-CM

## 2019-12-30 RX ORDER — LEVOTHYROXINE SODIUM 0.05 MG/1
50 TABLET ORAL DAILY
Qty: 90 TABLET | Refills: 1 | Status: SHIPPED | OUTPATIENT
Start: 2019-12-30 | End: 2020-07-08 | Stop reason: SDUPTHER

## 2019-12-30 RX ORDER — METFORMIN HYDROCHLORIDE 500 MG/1
500 TABLET, EXTENDED RELEASE ORAL
Qty: 90 TABLET | Refills: 1 | Status: SHIPPED | OUTPATIENT
Start: 2019-12-30 | End: 2020-06-18

## 2020-06-15 ENCOUNTER — TELEPHONE (OUTPATIENT)
Dept: INTERNAL MEDICINE | Facility: CLINIC | Age: 45
End: 2020-06-15

## 2020-06-15 NOTE — TELEPHONE ENCOUNTER
PT SPOUSE CALLED TO SCHEDULE PT APPT FOR PHYSICAL, NOTHING IS AVAILABLE TIL BEGINNING OF NEXT YEAR, CAN YOU SQUEEZE PT IN SOMETIME BEFORE THAT, PT LAST PHYSICAL WAS AUGUST 2019.    PLEASE ADVISE.  CALL BACK:[phone]

## 2020-06-18 ENCOUNTER — PATIENT MESSAGE (OUTPATIENT)
Dept: INTERNAL MEDICINE | Facility: CLINIC | Age: 45
End: 2020-06-18

## 2020-06-19 NOTE — TELEPHONE ENCOUNTER
From: Jose Cobos  To: Mony Regalado MD  Sent: 6/18/2020 4:41 PM EDT  Subject: Prescription Question    Hello Dr. Sykes,   How are you? I just received an automated message from [facility] saying that they had a recall on Metformin  mg tablets and they suggested to take alternative medication as advised by the PCP. So could you please advise if there is any other alternative medication that you could prescribe?    Thanks,  Jose

## 2020-07-08 DIAGNOSIS — E03.8 OTHER SPECIFIED HYPOTHYROIDISM: ICD-10-CM

## 2020-07-08 RX ORDER — LEVOTHYROXINE SODIUM 0.05 MG/1
50 TABLET ORAL DAILY
Qty: 90 TABLET | Refills: 0 | Status: SHIPPED | OUTPATIENT
Start: 2020-07-08 | End: 2020-08-19 | Stop reason: SDUPTHER

## 2020-08-19 ENCOUNTER — LAB (OUTPATIENT)
Dept: LAB | Facility: HOSPITAL | Age: 45
End: 2020-08-19

## 2020-08-19 ENCOUNTER — OFFICE VISIT (OUTPATIENT)
Dept: INTERNAL MEDICINE | Facility: CLINIC | Age: 45
End: 2020-08-19

## 2020-08-19 VITALS
DIASTOLIC BLOOD PRESSURE: 60 MMHG | SYSTOLIC BLOOD PRESSURE: 112 MMHG | TEMPERATURE: 97 F | HEART RATE: 74 BPM | BODY MASS INDEX: 28.32 KG/M2 | WEIGHT: 208.8 LBS | OXYGEN SATURATION: 98 %

## 2020-08-19 DIAGNOSIS — E78.5 HYPERLIPIDEMIA, UNSPECIFIED HYPERLIPIDEMIA TYPE: ICD-10-CM

## 2020-08-19 DIAGNOSIS — Z12.5 PROSTATE CANCER SCREENING: ICD-10-CM

## 2020-08-19 DIAGNOSIS — E04.2 MULTINODULAR GOITER: ICD-10-CM

## 2020-08-19 DIAGNOSIS — R73.03 PREDIABETES: ICD-10-CM

## 2020-08-19 DIAGNOSIS — J01.10 ACUTE FRONTAL SINUSITIS, RECURRENCE NOT SPECIFIED: ICD-10-CM

## 2020-08-19 DIAGNOSIS — E03.8 OTHER SPECIFIED HYPOTHYROIDISM: ICD-10-CM

## 2020-08-19 DIAGNOSIS — Z00.00 HEALTHCARE MAINTENANCE: ICD-10-CM

## 2020-08-19 DIAGNOSIS — K62.5 BRBPR (BRIGHT RED BLOOD PER RECTUM): ICD-10-CM

## 2020-08-19 DIAGNOSIS — Z12.11 SCREENING FOR COLON CANCER: ICD-10-CM

## 2020-08-19 DIAGNOSIS — I10 ESSENTIAL HYPERTENSION: ICD-10-CM

## 2020-08-19 DIAGNOSIS — Z00.00 HEALTHCARE MAINTENANCE: Primary | ICD-10-CM

## 2020-08-19 LAB
25(OH)D3 SERPL-MCNC: 56.6 NG/ML (ref 30–100)
ALBUMIN SERPL-MCNC: 5.3 G/DL (ref 3.5–5.2)
ALBUMIN/GLOB SERPL: 1.7 G/DL
ALP SERPL-CCNC: 80 U/L (ref 39–117)
ALT SERPL W P-5'-P-CCNC: 38 U/L (ref 1–41)
ANION GAP SERPL CALCULATED.3IONS-SCNC: 13.1 MMOL/L (ref 5–15)
AST SERPL-CCNC: 32 U/L (ref 1–40)
BACTERIA UR QL AUTO: NORMAL /HPF
BILIRUB SERPL-MCNC: 0.8 MG/DL (ref 0–1.2)
BILIRUB UR QL STRIP: NEGATIVE
BUN SERPL-MCNC: 8 MG/DL (ref 6–20)
BUN/CREAT SERPL: 9.6 (ref 7–25)
CALCIUM SPEC-SCNC: 10.3 MG/DL (ref 8.6–10.5)
CHLORIDE SERPL-SCNC: 100 MMOL/L (ref 98–107)
CHOLEST SERPL-MCNC: 178 MG/DL (ref 0–200)
CLARITY UR: CLEAR
CO2 SERPL-SCNC: 26.9 MMOL/L (ref 22–29)
COLOR UR: YELLOW
CREAT SERPL-MCNC: 0.83 MG/DL (ref 0.76–1.27)
DEPRECATED RDW RBC AUTO: 38.8 FL (ref 37–54)
ERYTHROCYTE [DISTWIDTH] IN BLOOD BY AUTOMATED COUNT: 12.5 % (ref 12.3–15.4)
GFR SERPL CREATININE-BSD FRML MDRD: 101 ML/MIN/1.73
GFR SERPL CREATININE-BSD FRML MDRD: 122 ML/MIN/1.73
GLOBULIN UR ELPH-MCNC: 3.1 GM/DL
GLUCOSE SERPL-MCNC: 109 MG/DL (ref 65–99)
GLUCOSE UR STRIP-MCNC: NEGATIVE MG/DL
HBA1C MFR BLD: 6.45 % (ref 4.8–5.6)
HCT VFR BLD AUTO: 46.3 % (ref 37.5–51)
HCV AB SER DONR QL: NORMAL
HDLC SERPL-MCNC: 50 MG/DL (ref 40–60)
HGB BLD-MCNC: 15.5 G/DL (ref 13–17.7)
HGB UR QL STRIP.AUTO: NEGATIVE
HYALINE CASTS UR QL AUTO: NORMAL /LPF
KETONES UR QL STRIP: NEGATIVE
LDLC SERPL CALC-MCNC: 113 MG/DL (ref 0–100)
LDLC/HDLC SERPL: 2.26 {RATIO}
LEUKOCYTE ESTERASE UR QL STRIP.AUTO: NEGATIVE
MCH RBC QN AUTO: 28.7 PG (ref 26.6–33)
MCHC RBC AUTO-ENTMCNC: 33.5 G/DL (ref 31.5–35.7)
MCV RBC AUTO: 85.7 FL (ref 79–97)
NITRITE UR QL STRIP: NEGATIVE
PH UR STRIP.AUTO: 7.5 [PH] (ref 5–8)
PLATELET # BLD AUTO: 263 10*3/MM3 (ref 140–450)
PMV BLD AUTO: 11.8 FL (ref 6–12)
POTASSIUM SERPL-SCNC: 4.2 MMOL/L (ref 3.5–5.2)
PROT SERPL-MCNC: 8.4 G/DL (ref 6–8.5)
PROT UR QL STRIP: NEGATIVE
PSA SERPL-MCNC: 0.54 NG/ML (ref 0–4)
RBC # BLD AUTO: 5.4 10*6/MM3 (ref 4.14–5.8)
RBC # UR: NORMAL /HPF
REF LAB TEST METHOD: NORMAL
SODIUM SERPL-SCNC: 140 MMOL/L (ref 136–145)
SP GR UR STRIP: 1.01 (ref 1–1.03)
SQUAMOUS #/AREA URNS HPF: NORMAL /HPF
T4 FREE SERPL-MCNC: 1.49 NG/DL (ref 0.93–1.7)
TRIGL SERPL-MCNC: 75 MG/DL (ref 0–150)
TSH SERPL DL<=0.05 MIU/L-ACNC: 3.02 UIU/ML (ref 0.27–4.2)
UROBILINOGEN UR QL STRIP: NORMAL
VIT B12 BLD-MCNC: 414 PG/ML (ref 211–946)
VLDLC SERPL-MCNC: 15 MG/DL (ref 5–40)
WBC # BLD AUTO: 7.99 10*3/MM3 (ref 3.4–10.8)
WBC UR QL AUTO: NORMAL /HPF

## 2020-08-19 PROCEDURE — 84439 ASSAY OF FREE THYROXINE: CPT | Performed by: INTERNAL MEDICINE

## 2020-08-19 PROCEDURE — 83036 HEMOGLOBIN GLYCOSYLATED A1C: CPT | Performed by: INTERNAL MEDICINE

## 2020-08-19 PROCEDURE — 85027 COMPLETE CBC AUTOMATED: CPT | Performed by: INTERNAL MEDICINE

## 2020-08-19 PROCEDURE — 99396 PREV VISIT EST AGE 40-64: CPT | Performed by: INTERNAL MEDICINE

## 2020-08-19 PROCEDURE — 80061 LIPID PANEL: CPT | Performed by: INTERNAL MEDICINE

## 2020-08-19 PROCEDURE — 80053 COMPREHEN METABOLIC PANEL: CPT | Performed by: INTERNAL MEDICINE

## 2020-08-19 PROCEDURE — 82306 VITAMIN D 25 HYDROXY: CPT | Performed by: INTERNAL MEDICINE

## 2020-08-19 PROCEDURE — G0103 PSA SCREENING: HCPCS | Performed by: INTERNAL MEDICINE

## 2020-08-19 PROCEDURE — 86803 HEPATITIS C AB TEST: CPT | Performed by: INTERNAL MEDICINE

## 2020-08-19 PROCEDURE — 81001 URINALYSIS AUTO W/SCOPE: CPT | Performed by: INTERNAL MEDICINE

## 2020-08-19 PROCEDURE — 82607 VITAMIN B-12: CPT | Performed by: INTERNAL MEDICINE

## 2020-08-19 PROCEDURE — 84443 ASSAY THYROID STIM HORMONE: CPT | Performed by: INTERNAL MEDICINE

## 2020-08-19 RX ORDER — LEVOTHYROXINE SODIUM 0.05 MG/1
50 TABLET ORAL DAILY
Qty: 90 TABLET | Refills: 1 | Status: SHIPPED | OUTPATIENT
Start: 2020-08-19 | End: 2021-02-10 | Stop reason: SDUPTHER

## 2020-08-19 RX ORDER — TELMISARTAN 20 MG/1
20 TABLET ORAL DAILY
Qty: 90 TABLET | Refills: 1 | Status: SHIPPED | OUTPATIENT
Start: 2020-08-19 | End: 2020-08-19 | Stop reason: SDUPTHER

## 2020-08-19 RX ORDER — LEVOTHYROXINE SODIUM 0.05 MG/1
50 TABLET ORAL DAILY
Qty: 90 TABLET | Refills: 1 | Status: SHIPPED | OUTPATIENT
Start: 2020-08-19 | End: 2020-08-19 | Stop reason: SDUPTHER

## 2020-08-19 RX ORDER — ATORVASTATIN CALCIUM 10 MG/1
10 TABLET, FILM COATED ORAL DAILY
Qty: 90 TABLET | Refills: 1 | Status: SHIPPED | OUTPATIENT
Start: 2020-08-19 | End: 2021-01-11

## 2020-08-19 RX ORDER — ATORVASTATIN CALCIUM 10 MG/1
10 TABLET, FILM COATED ORAL DAILY
Qty: 90 TABLET | Refills: 1 | Status: SHIPPED | OUTPATIENT
Start: 2020-08-19 | End: 2020-08-19 | Stop reason: SDUPTHER

## 2020-08-19 RX ORDER — LEVOCETIRIZINE DIHYDROCHLORIDE 5 MG/1
5 TABLET, FILM COATED ORAL EVERY EVENING
Qty: 90 TABLET | Refills: 1 | Status: SHIPPED | OUTPATIENT
Start: 2020-08-19

## 2020-08-19 RX ORDER — TELMISARTAN 20 MG/1
20 TABLET ORAL DAILY
Qty: 90 TABLET | Refills: 1 | Status: SHIPPED | OUTPATIENT
Start: 2020-08-19 | End: 2021-02-10 | Stop reason: SDUPTHER

## 2020-08-19 NOTE — PROGRESS NOTES
Chief Complaint   Patient presents with   • Annual Exam   • Diabetes     declines POC A1c       History of Present Illness  HM, Adult Male: The patient is being seen for a health maintenance evaluation. The last health maintenance visit was 1 year(s) ago.   Social History: Household members include spouse. He is  with 2 children. Work status: working full time. The patient has never smoked cigarettes. He reports occ. drinking alcohol. He has never used illicit drugs.   General Health: The patient's health is described as good. He has regular dental visits. He denies vision problems. He denies hearing loss. Immunizations status: up to date.   Lifestyle:. He consumes a diverse and healthy diet. He does not have any weight concerns. He exercises regularly. He does not use tobacco. He reports occasional alcohol use. He denies drug use.   Screening: Cancer screening reviewed and current.   Metabolic screening reviewed and current.   Risk screening reviewed and current.    EYE exam- [facility].      His myalgias- upper and lower back, and radiating to his thigh, seems better with miralax.  Increased forgetfulness dad has memory issues, and is concerned that he may be getting it as well.          Review of Systems   Constitutional: Negative for chills and fatigue.   HENT: Negative for congestion, ear pain and sore throat.    Eyes: Negative for pain, redness and visual disturbance.   Respiratory: Negative for cough and shortness of breath.    Cardiovascular: Positive for palpitations. Negative for chest pain and leg swelling.   Gastrointestinal: Positive for abdominal distention (bloating) and constipation. Negative for abdominal pain, diarrhea and nausea.   Endocrine: Negative for cold intolerance and heat intolerance.   Genitourinary: Negative for flank pain and urgency.   Musculoskeletal: Positive for myalgias. Negative for arthralgias and gait problem.   Skin: Negative for pallor and rash.   Neurological:  Negative for dizziness, weakness and headaches.   Psychiatric/Behavioral: Negative for dysphoric mood and sleep disturbance. The patient is not nervous/anxious.        Patient Active Problem List   Diagnosis   • Other specified hypothyroidism   • Hyperlipidemia   • Prediabetes   • Essential hypertension   • Multinodular goiter       Social History     Socioeconomic History   • Marital status:      Spouse name: Not on file   • Number of children: Not on file   • Years of education: Not on file   • Highest education level: Not on file   Tobacco Use   • Smoking status: Never Smoker   • Smokeless tobacco: Never Used   Substance and Sexual Activity   • Alcohol use: Yes     Comment: occasionally       Current Outpatient Medications on File Prior to Visit   Medication Sig Dispense Refill   • Ascorbic Acid (VITAMIN C) 100 MG chewable tablet Vitamin C   1 daily     • Cholecalciferol (VITAMIN D) 2000 units capsule Take  by mouth Daily.     • fluticasone (FLONASE) 50 MCG/ACT nasal spray 2 sprays into the nostril(s) as directed by provider Daily. 1 bottle 0   • Multiple Vitamin (MULTI VITAMIN DAILY PO) Take  by mouth Daily.       No current facility-administered medications on file prior to visit.        No Known Allergies    /60   Pulse 74   Temp 97 °F (36.1 °C)   Wt 94.7 kg (208 lb 12.8 oz)   SpO2 98% Comment: ra  BMI 28.32 kg/m²          The following portions of the patient's history were reviewed and updated as appropriate: allergies, current medications, past family history, past medical history, past social history, past surgical history and problem list.    Physical Exam   Constitutional: He is oriented to person, place, and time. He appears well-developed and well-nourished.   HENT:   Head: Normocephalic and atraumatic.   Right Ear: External ear normal.   Left Ear: External ear normal.   Mouth/Throat: No oropharyngeal exudate.   Eyes: Pupils are equal, round, and reactive to light. Conjunctivae are  normal. No scleral icterus.   Neck: Neck supple. No thyromegaly present.   Cardiovascular: Normal rate, regular rhythm and intact distal pulses. Exam reveals no gallop and no friction rub.   No murmur heard.  Pulmonary/Chest: Effort normal and breath sounds normal.   Abdominal: Soft. Bowel sounds are normal. He exhibits no distension and no mass. There is no tenderness. There is no rebound.   Musculoskeletal: He exhibits no edema or tenderness.   Lymphadenopathy:     He has no cervical adenopathy.   Neurological: He is alert and oriented to person, place, and time. He displays normal reflexes. No cranial nerve deficit. He exhibits normal muscle tone. Coordination normal.   Skin: Skin is warm and dry.   Psychiatric: He has a normal mood and affect. His behavior is normal. Judgment and thought content normal.   Nursing note and vitals reviewed.      Results for orders placed or performed in visit on 12/10/19   POC Influenza A / B   Result Value Ref Range    Rapid Influenza A Ag Negative Negative    Rapid Influenza B Ag Negative Negative    Internal Control Passed Passed    Lot Number [id number]     Expiration Date 12/11/21        Jose was seen today for annual exam and diabetes.    Diagnoses and all orders for this visit:    Healthcare maintenance  -     CBC (No Diff); Future  -     Comprehensive Metabolic Panel; Future  -     Lipid Panel; Future  -     Hepatitis C Antibody; Future  -     TSH; Future  -     T4, Free; Future  -     PSA Screen; Future  -     Vitamin D 25 Hydroxy; Future  -     Vitamin B12; Future  -     Urinalysis With Microscopic - Urine, Clean Catch; Future    Prediabetes  -     Hemoglobin A1c; Future  -     Discontinue: metFORMIN (Glucophage) 500 MG tablet; Take 1 tablet by mouth Daily With Breakfast.  -     Discontinue: metFORMIN (Glucophage) 500 MG tablet; Take 1 tablet by mouth Daily With Breakfast.  -     Urinalysis With Microscopic - Urine, Clean Catch; Future  -     metFORMIN (Glucophage) 500 MG  tablet; Take 1 tablet by mouth 2 (Two) Times a Day With Meals.    Other specified hypothyroidism  -     TSH; Future  -     T4, Free; Future  -     Discontinue: levothyroxine (SYNTHROID, LEVOTHROID) 50 MCG tablet; Take 1 tablet by mouth Daily.  -     levothyroxine (SYNTHROID, LEVOTHROID) 50 MCG tablet; Take 1 tablet by mouth Daily.  -     Cancel:  Thyroid    Prostate cancer screening  -     PSA Screen; Future    Essential hypertension  Comments:  check labs on the telmisartan  Orders:  -     Discontinue: telmisartan (MICARDIS) 20 MG tablet; Take 1 tablet by mouth Daily.  -     telmisartan (MICARDIS) 20 MG tablet; Take 1 tablet by mouth Daily.    Multinodular goiter  -     Cancel: US Thyroid; Future  -     Cancel:  Thyroid  -     Ambulatory Referral to Endocrinology    Hyperlipidemia, unspecified hyperlipidemia type  -     Discontinue: atorvastatin (Lipitor) 10 MG tablet; Take 1 tablet by mouth Daily.  -     atorvastatin (Lipitor) 10 MG tablet; Take 1 tablet by mouth Daily.    Acute frontal sinusitis, recurrence not specified  -     levocetirizine (XYZAL) 5 MG tablet; Take 1 tablet by mouth Every Evening.    Screening for colon cancer  -     Ambulatory Referral For Screening Colonoscopy    BRBPR (bright red blood per rectum)  -     Ambulatory Referral For Screening Colonoscopy    with some brbpr, likely hemorrhoids, but will proceed with colonoscopy.    Health Maintenance   Topic Date Due   • INFLUENZA VACCINE  08/01/2020   • LIPID PANEL  08/19/2021   • ANNUAL PHYSICAL  08/20/2021   • TDAP/TD VACCINES (2 - Td) 10/14/2022   • HEPATITIS C SCREENING  Completed       Discussion/Summary  Impression: health maintenance visit, healthy adult male.   Currently, he eats a healthy diet and has an adequate exercise regimen.   Prostate cancer screening: PSA was ordered.   Testicular cancer screening: monthly self testicular exam was advised.   Colorectal cancer screening: fecal occult blood testing is needed every year and  colonoscopy ordered as above .   Screening lab work includes glucose, lipid profile and 25-hydroxyvitamin D.   The immunizations are up to date.   Advice and education were given regarding cardiovascular risk reduction, healthy dietary habits, Seatbelt and helmet use and self skin examination.        Return in about 6 months (around 2/19/2021).

## 2020-08-28 ENCOUNTER — APPOINTMENT (OUTPATIENT)
Dept: ULTRASOUND IMAGING | Facility: HOSPITAL | Age: 45
End: 2020-08-28

## 2020-08-31 ENCOUNTER — TRANSCRIBE ORDERS (OUTPATIENT)
Dept: INTERNAL MEDICINE | Facility: CLINIC | Age: 45
End: 2020-08-31

## 2020-08-31 DIAGNOSIS — E03.8 OTHER SPECIFIED HYPOTHYROIDISM: Primary | ICD-10-CM

## 2020-08-31 DIAGNOSIS — E04.2 MULTINODULAR GOITER: ICD-10-CM

## 2020-09-01 ENCOUNTER — TELEPHONE (OUTPATIENT)
Dept: INTERNAL MEDICINE | Facility: CLINIC | Age: 45
End: 2020-09-01

## 2020-09-01 NOTE — TELEPHONE ENCOUNTER
Kathryn,   can you give him the CPT codes?  Also let him know that [facility] is just the group name for the Radiologists that work at [facility]. They don't perform the scan.    thanks

## 2020-09-04 DIAGNOSIS — E03.8 OTHER SPECIFIED HYPOTHYROIDISM: ICD-10-CM

## 2020-09-04 DIAGNOSIS — E04.2 MULTINODULAR GOITER: ICD-10-CM

## 2020-09-11 ENCOUNTER — TELEPHONE (OUTPATIENT)
Dept: INTERNAL MEDICINE | Facility: CLINIC | Age: 45
End: 2020-09-11

## 2020-09-11 NOTE — TELEPHONE ENCOUNTER
Patient states that he is getting a referral to a Endocrinologist and would like to see if that has been done. Also, he would also talk about his lab work.    He can be reached at [phone]

## 2020-09-23 ENCOUNTER — APPOINTMENT (OUTPATIENT)
Dept: PREADMISSION TESTING | Facility: HOSPITAL | Age: 45
End: 2020-09-23

## 2020-12-05 ENCOUNTER — TELEPHONE (OUTPATIENT)
Dept: INTERNAL MEDICINE | Facility: CLINIC | Age: 45
End: 2020-12-05

## 2020-12-06 NOTE — TELEPHONE ENCOUNTER
----- Message from Tami Armendariz MA sent at 12/5/2020  8:47 AM EST -----  Regarding: FW: Visit Follow-Up Question  Contact: [phone]    ----- Message -----  From: Jose Cobos  Sent: 12/4/2020   7:39 PM EST  To: [facility]  Subject: Visit Follow-Up Question                         Jake Cody, I had applied for [employer] with [employer] and I gave my medicals as well a couple of weeks ago but it seems they were waiting for some medical information from you and you Or [facility] did not respond yet for some reason. Please find the attached letter that I got from [employer] and let me know?    Thanks,  Jose

## 2020-12-06 NOTE — TELEPHONE ENCOUNTER
Hazel, please fax patient's last office notes and labs to Life insurance as requested.  I've asked him to stop by and sign a release , so we can have it on file.

## 2020-12-08 NOTE — TELEPHONE ENCOUNTER
Pt came in and filled out ALISON for [employer].  I have sent the request to [employer].  They have up to 30 days to release the records. I advised patient of this information.

## 2020-12-08 NOTE — TELEPHONE ENCOUNTER
LM for pt trmc.  Need to let him know er have not gotten a release from his Ins co as of now.  I do not have a fax number or contact to fax this to, need for pt to have [employer] refax request with needed info.

## 2020-12-29 PROBLEM — R73.9 HYPERGLYCEMIA: Status: ACTIVE | Noted: 2017-05-08

## 2021-01-04 ENCOUNTER — TELEPHONE (OUTPATIENT)
Dept: INTERNAL MEDICINE | Facility: CLINIC | Age: 46
End: 2021-01-04

## 2021-01-04 NOTE — TELEPHONE ENCOUNTER
[employer] IS CALLING ABOUT AN ODR FORM THAT WAS FAXED ON 12/28/2020    PIETER FROM [employer]   [phone]  EXT [phone]

## 2021-01-06 NOTE — TELEPHONE ENCOUNTER
[employer] called again to check on the status of the form.    They stated they need this back urgently.

## 2021-01-11 ENCOUNTER — OFFICE VISIT (OUTPATIENT)
Dept: ENDOCRINOLOGY | Facility: CLINIC | Age: 46
End: 2021-01-11

## 2021-01-11 VITALS
HEIGHT: 73 IN | DIASTOLIC BLOOD PRESSURE: 78 MMHG | BODY MASS INDEX: 29.69 KG/M2 | SYSTOLIC BLOOD PRESSURE: 132 MMHG | WEIGHT: 224 LBS | HEART RATE: 93 BPM | OXYGEN SATURATION: 99 %

## 2021-01-11 DIAGNOSIS — E04.1 RIGHT THYROID NODULE: Primary | ICD-10-CM

## 2021-01-11 PROCEDURE — 99243 OFF/OP CNSLTJ NEW/EST LOW 30: CPT | Performed by: INTERNAL MEDICINE

## 2021-01-11 PROCEDURE — 76536 US EXAM OF HEAD AND NECK: CPT | Performed by: INTERNAL MEDICINE

## 2021-01-25 PROBLEM — E06.3 AUTOIMMUNE HYPOTHYROIDISM: Status: ACTIVE | Noted: 2018-02-14

## 2021-01-25 NOTE — PROGRESS NOTES
Thyroid Ultrasound Report  [facility]  [place], [place]    Date: 01/11/2021  Indication: thyroid nodule     Comparison Imaging:  Radiology 08/26/2019  Clinical History: 45 y.o. male with h/o autoimmune thyroid disease. With reported thyroid nodules on outside imaging.     Real time high resolution imaging of the thyroid gland was performed in transverse and longitudinal planes.  The right lobe measured 4.33 cm in Length x 1.61 cm in AP diameter x 1.65 cm in TV dimension.  The isthmus measured 0.35 cm in thickness.  The left thyroid lobe measured 4.07 cm in length x 1.60 cm in AP diameter x 1.63 cm in TV dimension.    The thyroid gland appeared overall isoechoic with slightly heterogenous echotexture.     In the right mid lobe, posteriorly, a 1.25(L) x 0.71(AP) x 1.05(T) cm isoechoic, mixed cystic and solid nodule was identified. The nodule had a smooth margin, no vascularity, and no microcalcifications. (TI-RADS score of 1)    No discrete nodules were identified in the left lobe or isthmus.     No pathologic lymph nodes were seen.     IMPRESSION:   1) The thyroid gland was normal in size by measured dimensions.   2) No discrete nodules were identified in the left lobe or isthmus.   3) In the right mid lobe, posteriorly, a 1.25(L) x 0.71(AP) x 1.05(T) cm isoechoic, mixed cystic and solid nodule was identified. The nodule had a smooth margin, no vascularity, and no microcalcifications. This nodule lacked suspicious US characteristics and did not meet criteria for FNA per American Thyroid Association guidelines. (TI-RADS score of 1)    RECOMMENDATIONS: Repeat Thyroid US recommended if changes in the gland / neck are noted on physical exam.     Signed: Sandy Childress MD

## 2021-01-25 NOTE — PROGRESS NOTES
"Chief Complaint   Patient presents with   • Thyroid Nodule     New Consult JULIO Regalado        HPI:   Jose Cobos is a 45 y.o.male sent in consultation by Mony Regalado MD for further evaluation of thyroid gland. His history is as follows:    1) autoimmune thyroid disease:  -Was initially diagnosed with hyperthyroidism in 2012.  Was treated with methimazole for 6 months.  Subsequently developed hypothyroidism.   -Is currently on levothyroxine 50 mcg daily.  Last TSH 3.020 (08/19/2020)    2) thyroid nodule:   -In 2019, patient presented to his PCP with complaints of globus sensation.  He was also thought to have thyromegaly on physical exam.  This is further evaluated with a thyroid ultrasound completed at [facility] radiology on 8/26/2019.      ( Radiology Thyroid US 08/26/2019):  The report described: \" The right lobe of the thyroid measures 4.6 x 1.5 x 1.9 cm. No underlying mass or lesion is identified. There is a small hypoechoic nodule identified measuring 1 cm with no increased blood flow identified. The left lobe of the thyroid measures 4.5 x 1.1 x 1.6 cm with a small hypoechoic 4 mm nodule identified with no increased blood flow. No  dominant mass or nodule is identified. IMPRESSION: Findings suggesting a multinodular thyroid with no dominant  mass or nodule identified. Continued followup in 1 year is recommended for stability.     ([facility] Thyroid US: 09/03/2020): The report described \"Solitary 1.3 cm TI-RADS T4 nodule at the lower pole of the right lobe.  Follow-up thyroid ultrasound in 12 months is recommended.\"    FMH: no known thyroid cancer, pt's mother has hypothyroidism, pt's sister has hypothyroidism  PMH: no h/o irradiation of head, neck, or chest    Review of Systems   Constitutional: Negative.    HENT: Negative.    Eyes: Negative.    Respiratory: Negative.    Cardiovascular: Negative.    Gastrointestinal: Negative.    Endocrine: Negative.    Genitourinary: Negative.  " "  Musculoskeletal: Negative.    Skin: Negative.    Allergic/Immunologic: Negative.    Neurological: Negative.    Hematological: Negative.    Psychiatric/Behavioral: Negative.      Past Medical History:   Diagnosis Date   • Diabetes mellitus (CMS/HCC)    • Hypertension    • Thyroid disease      family history includes Alzheimer's disease in his father; Diabetes in his father; Hypertension in his father and mother; Thyroid disease in his sister.  Past Surgical History:   Procedure Laterality Date   • KNEE ACL RECONSTRUCTION  2016     Social History     Tobacco Use   • Smoking status: Never Smoker   • Smokeless tobacco: Never Used   Substance Use Topics   • Alcohol use: Yes     Comment: occasionally   • Drug use: Not on file     Outpatient Medications Prior to Visit   Medication Sig Dispense Refill   • Ascorbic Acid (VITAMIN C) 100 MG chewable tablet Vitamin C   1 daily     • Cholecalciferol (VITAMIN D) 2000 units capsule Take  by mouth Daily.     • fluticasone (FLONASE) 50 MCG/ACT nasal spray 2 sprays into the nostril(s) as directed by provider Daily. 1 bottle 0   • levocetirizine (XYZAL) 5 MG tablet Take 1 tablet by mouth Every Evening. 90 tablet 1   • levothyroxine (SYNTHROID, LEVOTHROID) 50 MCG tablet Take 1 tablet by mouth Daily. 90 tablet 1   • metFORMIN (Glucophage) 500 MG tablet Take 1 tablet by mouth 2 (Two) Times a Day With Meals. 180 tablet 1   • Multiple Vitamin (MULTI VITAMIN DAILY PO) Take  by mouth Daily.     • telmisartan (MICARDIS) 20 MG tablet Take 1 tablet by mouth Daily. 90 tablet 1   • atorvastatin (Lipitor) 10 MG tablet Take 1 tablet by mouth Daily. 90 tablet 1     No facility-administered medications prior to visit.      No Known Allergies    /78   Pulse 93   Ht 185.4 cm (73\")   Wt 102 kg (224 lb)   SpO2 99%   BMI 29.55 kg/m²   Physical Exam  Vitals signs reviewed.   Constitutional:       General: He is not in acute distress.     Appearance: He is well-developed. He is not diaphoretic. "   HENT:      Head: Normocephalic.   Eyes:      Conjunctiva/sclera: Conjunctivae normal.      Pupils: Pupils are equal, round, and reactive to light.   Neck:      Musculoskeletal: Neck supple.      Thyroid: No thyromegaly.      Trachea: No tracheal deviation.      Comments: No palpable thyroid nodules    Cardiovascular:      Rate and Rhythm: Normal rate and regular rhythm.      Heart sounds: Normal heart sounds. No murmur.   Pulmonary:      Effort: Pulmonary effort is normal. No respiratory distress.      Breath sounds: Normal breath sounds.   Lymphadenopathy:      Cervical: No cervical adenopathy.   Skin:     General: Skin is warm and dry.      Findings: No erythema.   Neurological:      Mental Status: He is alert and oriented to person, place, and time.      Cranial Nerves: No cranial nerve deficit.   Psychiatric:         Behavior: Behavior normal.       LABS/IMAGING: outside records reviewed and summarized in HPI  Results for orders placed or performed in visit on 08/19/20   CBC (No Diff)    Specimen: Blood   Result Value Ref Range    WBC 7.99 3.40 - 10.80 10*3/mm3    RBC 5.40 4.14 - 5.80 10*6/mm3    Hemoglobin 15.5 13.0 - 17.7 g/dL    Hematocrit 46.3 37.5 - 51.0 %    MCV 85.7 79.0 - 97.0 fL    MCH 28.7 26.6 - 33.0 pg    MCHC 33.5 31.5 - 35.7 g/dL    RDW 12.5 12.3 - 15.4 %    RDW-SD 38.8 37.0 - 54.0 fl    MPV 11.8 6.0 - 12.0 fL    Platelets 263 140 - 450 10*3/mm3   Comprehensive Metabolic Panel    Specimen: Blood   Result Value Ref Range    Glucose 109 (H) 65 - 99 mg/dL    BUN 8 6 - 20 mg/dL    Creatinine 0.83 0.76 - 1.27 mg/dL    Sodium 140 136 - 145 mmol/L    Potassium 4.2 3.5 - 5.2 mmol/L    Chloride 100 98 - 107 mmol/L    CO2 26.9 22.0 - 29.0 mmol/L    Calcium 10.3 8.6 - 10.5 mg/dL    Total Protein 8.4 6.0 - 8.5 g/dL    Albumin 5.30 (H) 3.50 - 5.20 g/dL    ALT (SGPT) 38 1 - 41 U/L    AST (SGOT) 32 1 - 40 U/L    Alkaline Phosphatase 80 39 - 117 U/L    Total Bilirubin 0.8 0.0 - 1.2 mg/dL    eGFR Non African Amer  101 >60 mL/min/1.73    eGFR  African Amer 122 >60 mL/min/1.73    Globulin 3.1 gm/dL    A/G Ratio 1.7 g/dL    BUN/Creatinine Ratio 9.6 7.0 - 25.0    Anion Gap 13.1 5.0 - 15.0 mmol/L   Lipid Panel    Specimen: Blood   Result Value Ref Range    Total Cholesterol 178 0 - 200 mg/dL    Triglycerides 75 0 - 150 mg/dL    HDL Cholesterol 50 40 - 60 mg/dL    LDL Cholesterol  113 (H) 0 - 100 mg/dL    VLDL Cholesterol 15 5 - 40 mg/dL    LDL/HDL Ratio 2.26    Hepatitis C Antibody    Specimen: Blood   Result Value Ref Range    Hepatitis C Ab Non-Reactive Non-Reactive   TSH    Specimen: Blood   Result Value Ref Range    TSH 3.020 0.270 - 4.200 uIU/mL   T4, Free    Specimen: Blood   Result Value Ref Range    Free T4 1.49 0.93 - 1.70 ng/dL   PSA Screen    Specimen: Blood   Result Value Ref Range    PSA 0.541 0.000 - 4.000 ng/mL   Vitamin D 25 Hydroxy    Specimen: Blood   Result Value Ref Range    25 Hydroxy, Vitamin D 56.6 30.0 - 100.0 ng/ml   Hemoglobin A1c    Specimen: Blood   Result Value Ref Range    Hemoglobin A1C 6.45 (H) 4.80 - 5.60 %   Vitamin B12    Specimen: Blood   Result Value Ref Range    Vitamin B-12 414 211 - 946 pg/mL   Urinalysis without microscopic (no culture) - Urine, Clean Catch    Specimen: Urine, Clean Catch   Result Value Ref Range    Color, UA Yellow Yellow, Straw    Appearance, UA Clear Clear    pH, UA 7.5 5.0 - 8.0    Specific Gravity, UA 1.007 1.005 - 1.030    Glucose, UA Negative Negative    Ketones, UA Negative Negative    Bilirubin, UA Negative Negative    Blood, UA Negative Negative    Protein, UA Negative Negative    Leuk Esterase, UA Negative Negative    Nitrite, UA Negative Negative    Urobilinogen, UA 0.2 E.U./dL 0.2 - 1.0 E.U./dL   Urinalysis, Microscopic Only - Urine, Clean Catch    Specimen: Urine, Clean Catch   Result Value Ref Range    RBC, UA 0-2 None Seen, 0-2 /HPF    WBC, UA 0-2 None Seen, 0-2 /HPF    Bacteria, UA None Seen None Seen /HPF    Squamous Epithelial Cells, UA 0-2 None Seen, 0-2  /HPF    Hyaline Casts, UA None Seen None Seen /LPF    Methodology Automated Microscopy        PROCEDURES (in office):  Thyroid Ultrasound Report  [facility]  [place], [place]    Date: 01/11/2021  Indication: thyroid nodule     Comparison Imaging:  Radiology 08/26/2019  Clinical History: 45 y.o. male with h/o autoimmune thyroid disease. With reported thyroid nodules on outside imaging.     Real time high resolution imaging of the thyroid gland was performed in transverse and longitudinal planes.  The right lobe measured 4.33 cm in Length x 1.61 cm in AP diameter x 1.65 cm in TV dimension.  The isthmus measured 0.35 cm in thickness.  The left thyroid lobe measured 4.07 cm in length x 1.60 cm in AP diameter x 1.63 cm in TV dimension.    The thyroid gland appeared overall isoechoic with slightly heterogenous echotexture.     In the right mid lobe, posteriorly, a 1.25(L) x 0.71(AP) x 1.05(T) cm isoechoic, mixed cystic and solid nodule was identified. The nodule had a smooth margin, no vascularity, and no microcalcifications. (TI-RADS score of 1)    No discrete nodules were identified in the left lobe or isthmus.     No pathologic lymph nodes were seen.     IMPRESSION:   1) The thyroid gland was normal in size by measured dimensions.   2) No discrete nodules were identified in the left lobe or isthmus.   3) In the right mid lobe, posteriorly, a 1.25(L) x 0.71(AP) x 1.05(T) cm isoechoic, mixed cystic and solid nodule was identified. The nodule had a smooth margin, no vascularity, and no microcalcifications. This nodule lacked suspicious US characteristics and did not meet criteria for FNA per American Thyroid Association guidelines. (TI-RADS score of 1)    RECOMMENDATIONS: Repeat Thyroid US recommended if changes in the gland / neck are noted on physical exam.     Signed: Sandy Childress MD      ASSESSMENT/PLAN:  1) Right thyroid nodule: Thyroid US completed today showed -   - The thyroid gland was normal in  size by measured dimensions.   - No discrete nodules were identified in the left lobe or isthmus.   - In the right mid lobe, posteriorly, a 1.25(L) x 0.71(AP) x 1.05(T) cm isoechoic, mixed cystic and solid nodule was identified. The nodule had a smooth margin, no vascularity, and no microcalcifications. This nodule lacked suspicious US characteristics and did not meet criteria for FNA per American Thyroid Association guidelines. (TI-RADS score of 1)    RECOMMENDATIONS: Repeat Thyroid US recommended if changes in the gland / neck are noted on physical exam.     Reviewed images with patient. Reassured that the gland is normal in size and not consistent with goiter. Discussed that the right lobe nodule had reassuring US characteristics suggestive of a benign colloid nodule.    Advised pt to RTC as needed.     Counseling was given to patient for the following topics: clinical significance of thyroid nodules, risk factors for thyroid cancer, indications for FNA, and reviewing US images with patient . Total face to face time of the encounter was 45 minutes and 30 minutes was spent counseling.    Signed: Sandy Childress MD

## 2021-02-10 DIAGNOSIS — I10 ESSENTIAL HYPERTENSION: ICD-10-CM

## 2021-02-10 DIAGNOSIS — E03.8 OTHER SPECIFIED HYPOTHYROIDISM: ICD-10-CM

## 2021-02-11 RX ORDER — LEVOTHYROXINE SODIUM 0.05 MG/1
50 TABLET ORAL DAILY
Qty: 90 TABLET | Refills: 0 | Status: SHIPPED | OUTPATIENT
Start: 2021-02-11 | End: 2021-05-17

## 2021-02-11 RX ORDER — TELMISARTAN 20 MG/1
20 TABLET ORAL DAILY
Qty: 90 TABLET | Refills: 1 | Status: SHIPPED | OUTPATIENT
Start: 2021-02-11 | End: 2021-08-16

## 2021-05-07 DIAGNOSIS — R73.03 PREDIABETES: ICD-10-CM

## 2021-05-16 DIAGNOSIS — E03.8 OTHER SPECIFIED HYPOTHYROIDISM: ICD-10-CM

## 2021-05-17 RX ORDER — LEVOTHYROXINE SODIUM 0.05 MG/1
50 TABLET ORAL DAILY
Qty: 90 TABLET | Refills: 0 | Status: SHIPPED | OUTPATIENT
Start: 2021-05-17 | End: 2021-08-16

## 2021-05-17 NOTE — TELEPHONE ENCOUNTER
Last Office Visit: 8/19/20  Next Office Visit:0    Labs completed in past 6 months? no  Labs completed in past year? yes    Last Refill Date: 2/11/21  Quantity: 90  Refills:0    Pharmacy:     Please review pended refill request for any changes needed on refills or quantities. Thank you!

## 2021-06-01 DIAGNOSIS — R73.03 PREDIABETES: ICD-10-CM

## 2021-08-15 DIAGNOSIS — I10 ESSENTIAL HYPERTENSION: ICD-10-CM

## 2021-08-15 DIAGNOSIS — E03.8 OTHER SPECIFIED HYPOTHYROIDISM: ICD-10-CM

## 2021-08-16 RX ORDER — LEVOTHYROXINE SODIUM 50 MCG
TABLET ORAL
Qty: 90 TABLET | Refills: 0 | Status: SHIPPED | OUTPATIENT
Start: 2021-08-16

## 2021-08-16 RX ORDER — TELMISARTAN 20 MG/1
TABLET ORAL
Qty: 90 TABLET | Refills: 0 | Status: SHIPPED | OUTPATIENT
Start: 2021-08-16

## 2021-09-15 NOTE — TELEPHONE ENCOUNTER
----- Message from Jose Cobos sent at 9/1/2020 11:41 AM EDT -----  Regarding: RE: Visit Follow-Up Question  Contact: [phone]  Jake Sykes,                                  I got a call from [facility] today saying that they have scheduled an appointment for the Thyroid Ultrasound on Sept 3rd @ 3:40 PM and when I asked how much I need to pay, they came back with an estimate of $155 which appears still a bit high when compared to last year. Is there any way that you could give me the CPT codes that they use it for the exam so I could try to get the quote from [facility] as it was less costly when I had the exam done over there? Please check and let me know your feedback so I could proceed accordingly.    Thanks,  Jose  ----- Message -----  From: Mony Regalado MD  Sent: 8/30/2020  9:22 PM EDT  To: Jose Cobos  Subject: RE: Visit Follow-Up Question  I'm not sure then.  You could call and check with your insurance why its different this time....    CR    ----- Message -----     From: Jose Cobos     Sent: 8/30/2020  9:16 PM EDT       To: Mony Regalado MD  Subject: RE: Visit Follow-Up Question    Jake Sykes,                                   My insurance did not change since last year and also when I went to [facility] last year, the provider billed $94 and I had to pay only $22 after the deductible...please see the attached screenshot of that bill from last year and let me know your thoughts...    ThanksJose  ----- Message -----  From: Mony Regalado MD  Sent: 8/30/2020  7:16 PM EDT  To: Jose Cobos  Subject: RE: Visit Follow-Up Question  I have placed the order to be scheduled at the [facility].  Last year you had it done at [facility].  [facility] are [facility].    The reason it is not covered this year, could be a change in insurance, or that you may have met your  deductible last year before the thyroid ultrasound, therefore it was covered.    CR    ----- Message -----     From: Jose Cobos     Sent: 8/28/2020  9:15 AM EDT       To: Mony Regalado MD  Subject: RE: Visit Follow-Up Question    Thanks, . I just cancelled the Neck Ultrasound Appointment with [facility]. Please reschedule with either [facility] Or [facility] where ever it is inexpensive. I think last year I went to [facility] to get this done. But, if [facility] offers less rate then I am happy as well...    Regards,  Jose  ----- Message -----  From: Mony eRgalado MD  Sent: 8/28/2020  8:05 AM EDT  To: Jose Cobos  Subject: RE: Visit Follow-Up Question  Please cancel the one from [facility], and let me know once you do, I can reschedule it at [facility], which will be less expensive.    CR    ----- Message -----     From: Jose Cobos     Sent: 8/27/2020  6:16 PM EDT       To: Mony Regalado MD  Subject: Visit Follow-Up Question    Helmilly Cody,                                 I got a call from [facility] this afternoon saying that I need to pay a copay of $519.49 Prepay Due for my Neck Ultra Sound for my Thyroid Check tomorrow afternoon. But, when I had the same testing done at [facility] prescribed by you, I had that done with $94 only. I am not sure why they are charging 5 times for the same testing though? If that is the case, this would be too much expensive to me. Please advice.    ThanksJose         [de-identified] : RIGHT  KNEE CORTISONE INJECTION\par Discussed at length with the patient the planned steroid and lidocaine injection. The risks, benefits, convalescence and alternatives were reviewed. The possible side effects discussed included but were not limited to: pain, swelling, heat and redness. These symptoms are generally mild but if they are extensive then contact the office. Giving pain relievers by mouth such as NSAID’s or Tylenol can generally treat the reactions to steroid and lidocaine. Rare cases of infection have been noted. Rash, hives and itching may occur post injection. If you have muscle pain or cramps, flushing and or swelling of the face, rapid heart beat, nausea, dizziness, fever, chills, headache, difficulty breathing, swelling in the arms or legs, or have a prickly feeling of your skin, contact a health care provider immediately.\par \par Following this discussion, the knee was prepped with betadine and under sterile conditions 5 cc of 2% lidocaine and 1 cc depo-medrol (40mg) were injected with a 21 gauge needle. The needle was introduced into the joint, aspiration was performed to ensure intra-articular placement and the medication was injected. Upon withdrawal of the needle the site was cleaned with alcohol and a bandaid applied. The patient tolerated the injection well and there were no adverse effects. Post injection instructions included no strenuous activity for 24 hours, cryotherapy and if there are any adverse effects to contact the office.\par

## 2021-09-28 DIAGNOSIS — R73.03 PREDIABETES: ICD-10-CM

## 2021-10-21 DIAGNOSIS — E78.5 HYPERLIPIDEMIA, UNSPECIFIED HYPERLIPIDEMIA TYPE: ICD-10-CM

## 2021-10-21 RX ORDER — ATORVASTATIN CALCIUM 10 MG/1
TABLET, FILM COATED ORAL
Qty: 90 TABLET | Refills: 1 | OUTPATIENT
Start: 2021-10-21

## 2023-03-24 DIAGNOSIS — E03.8 OTHER SPECIFIED HYPOTHYROIDISM: ICD-10-CM

## 2023-03-24 DIAGNOSIS — I10 ESSENTIAL HYPERTENSION: ICD-10-CM

## 2023-03-24 RX ORDER — LEVOTHYROXINE SODIUM 50 MCG
TABLET ORAL
Qty: 90 TABLET | Refills: 0 | OUTPATIENT
Start: 2023-03-24

## 2023-03-24 RX ORDER — TELMISARTAN 20 MG/1
TABLET ORAL
Qty: 90 TABLET | Refills: 0 | OUTPATIENT
Start: 2023-03-24

## 2023-04-09 DIAGNOSIS — E03.8 OTHER SPECIFIED HYPOTHYROIDISM: ICD-10-CM

## 2023-04-09 DIAGNOSIS — I10 ESSENTIAL HYPERTENSION: ICD-10-CM

## 2023-04-10 RX ORDER — LEVOTHYROXINE SODIUM 50 MCG
TABLET ORAL
Qty: 90 TABLET | Refills: 0 | OUTPATIENT
Start: 2023-04-10

## 2023-04-10 RX ORDER — TELMISARTAN 20 MG/1
TABLET ORAL
Qty: 90 TABLET | Refills: 0 | OUTPATIENT
Start: 2023-04-10

## 2023-08-31 ENCOUNTER — OFFICE VISIT (OUTPATIENT)
Dept: URBAN - METROPOLITAN AREA SURGERY CENTER 8 | Facility: SURGERY CENTER | Age: 48
End: 2023-08-31

## 2023-10-05 ENCOUNTER — OFFICE VISIT (OUTPATIENT)
Dept: URBAN - METROPOLITAN AREA SURGERY CENTER 8 | Facility: SURGERY CENTER | Age: 48
End: 2023-10-05

## 2024-03-23 NOTE — TELEPHONE ENCOUNTER
Med refilled per protocol.   
Mony Regalado MD 8/27/2018 11:23 PM EDT     Please send his Rx for metformin xr for 90 days with 2 refills.    thanks  ----- Message -----  From: Jose Cobos  Sent: 8/27/2018 6:56 PM  To: Mony Regalado MD  Subject: Prescription Question     Hello Doctor,   Could you please send the approval for [facility] regarding my 90 day supply for Metformin ER - 500 mg?    Thanks  Jose    
Attending and PA/NP shared services statement (NON-critical care):
home

## 2024-11-18 ENCOUNTER — TELEPHONE ENCOUNTER (OUTPATIENT)
Dept: URBAN - METROPOLITAN AREA CLINIC 35 | Facility: CLINIC | Age: 49
End: 2024-11-18

## 2024-11-18 ENCOUNTER — OFFICE VISIT (OUTPATIENT)
Dept: URBAN - METROPOLITAN AREA CLINIC 33 | Facility: CLINIC | Age: 49
End: 2024-11-18

## 2024-11-18 NOTE — HPI-COLORECTAL CANCER SCREENING
49 year old male patient presents for colorectal cancer screening consult. He is here due to age. Admits this will be first colonoscopy. Admits /denies family hx of colon cancer. He admits /denies family hx of colon polyps. Admits/denies normal bowel habits at this time. Admits/denies any current symptoms of rectal bleeding, melena or mucus.

## 2024-11-18 NOTE — HPI-CONSTIPATION
49 year old male patient presents for constipation consult. States symptoms began ___. He admits /denies taking any medications currently prescribed or OTC for relief. Admits having __bowel movement a day/week. Admits stools are ___. He admits /denies rectal bleeding/melena. Admits /denies strenuous bowel movements. He admits /denies having recent abdominal Xray. He admits /denies having recent labs.  OUTSIDE LABS  (11/09/2024) CBC w/DIFF/PLT Lymphs (absolute) - 3.4 H Eos (absolute) - 0.6 H All other values WNL  CMP Glucose - 116 H BUN/Creatinine Ratio - 8 L All other values WNL  Lipid Panel  LDL - 103 H All other values WNL  TSH - WNL A1c - 6.4 H  (09/14/2024) CMP  Glucose - 127 H BUN/Creatine Ratio - 8 L All other values WNL  Lipid Panel  HDL - 39 L All other values WNL  A1c - 6.5 H  TSH - WNL  (02/27/2024) CMP Glucose - 164 H BUN/Creatinine Ratio - 8 L AST - 42 H ALT - 63 H All other values WNL  Lipid Panel  Triglycerides - 213 H All other values WNL  A1c - 6.9 H TSH - WNL

## 2024-11-18 NOTE — HPI-BLOATING/GAS
49 year old male patient complains of bloating. He admits symptoms have been present since __. Describes symptoms as ___. Admits symptoms are more present during /after __. He admits /denies taking any OTC medications to relieve symptoms. He admits /denies having any previous breath test including H. Pylori or SIBO.

## 2024-11-20 ENCOUNTER — LAB OUTSIDE AN ENCOUNTER (OUTPATIENT)
Dept: URBAN - METROPOLITAN AREA CLINIC 33 | Facility: CLINIC | Age: 49
End: 2024-11-20

## 2024-11-20 ENCOUNTER — DASHBOARD ENCOUNTERS (OUTPATIENT)
Age: 49
End: 2024-11-20

## 2024-11-20 ENCOUNTER — OFFICE VISIT (OUTPATIENT)
Dept: URBAN - METROPOLITAN AREA CLINIC 33 | Facility: CLINIC | Age: 49
End: 2024-11-20
Payer: COMMERCIAL

## 2024-11-20 VITALS
HEIGHT: 73 IN | OXYGEN SATURATION: 98 % | BODY MASS INDEX: 27.04 KG/M2 | DIASTOLIC BLOOD PRESSURE: 78 MMHG | HEART RATE: 76 BPM | SYSTOLIC BLOOD PRESSURE: 116 MMHG | WEIGHT: 204 LBS

## 2024-11-20 DIAGNOSIS — R14.0 ABDOMINAL BLOATING: ICD-10-CM

## 2024-11-20 DIAGNOSIS — I10 PRIMARY HYPERTENSION: ICD-10-CM

## 2024-11-20 DIAGNOSIS — R13.19 ESOPHAGEAL DYSPHAGIA: ICD-10-CM

## 2024-11-20 DIAGNOSIS — R10.13 EPIGASTRIC PAIN: ICD-10-CM

## 2024-11-20 DIAGNOSIS — Z12.11 ENCOUNTER FOR SCREENING FOR MALIGNANT NEOPLASM OF COLON: ICD-10-CM

## 2024-11-20 DIAGNOSIS — E11.9 TYPE 2 DIABETES MELLITUS WITHOUT COMPLICATION, WITHOUT LONG-TERM CURRENT USE OF INSULIN: ICD-10-CM

## 2024-11-20 DIAGNOSIS — K59.00 CONSTIPATION, UNSPECIFIED CONSTIPATION TYPE: ICD-10-CM

## 2024-11-20 PROBLEM — 14760008: Status: ACTIVE | Noted: 2024-11-20

## 2024-11-20 PROBLEM — 313436004: Status: ACTIVE | Noted: 2024-11-20

## 2024-11-20 PROBLEM — 116289008: Status: ACTIVE | Noted: 2024-11-20

## 2024-11-20 PROBLEM — 305058001: Status: ACTIVE | Noted: 2024-11-20

## 2024-11-20 PROBLEM — 59621000: Status: ACTIVE | Noted: 2024-11-20

## 2024-11-20 PROCEDURE — 99204 OFFICE O/P NEW MOD 45 MIN: CPT | Performed by: INTERNAL MEDICINE

## 2024-11-20 RX ORDER — METFORMIN HYDROCHLORIDE 750 MG/1
1 TABLET WITH EVENING MEAL TABLET, EXTENDED RELEASE ORAL TWICE A DAY
Status: ACTIVE | COMMUNITY

## 2024-11-20 RX ORDER — TELMISARTAN 20 MG/1
2 TABLETS TABLET ORAL ONCE A DAY
Status: ACTIVE | COMMUNITY

## 2024-11-20 RX ORDER — OLANZAPINE 5 MG/1
1 TABLET TABLET ORAL ONCE A DAY
Status: ACTIVE | COMMUNITY

## 2024-11-20 RX ORDER — ATORVASTATIN CALCIUM 10 MG/1
1 TABLET TABLET, FILM COATED ORAL ONCE A DAY
Status: ACTIVE | COMMUNITY

## 2024-11-20 RX ORDER — LEVOTHYROXINE SODIUM 50 UG/1
1 TABLET IN THE MORNING ON AN EMPTY STOMACH TABLET ORAL ONCE A DAY
Status: ACTIVE | COMMUNITY

## 2024-11-20 RX ORDER — SODIUM, POTASSIUM,MAG SULFATES 17.5-3.13G
AS DIRECTED SOLUTION, RECONSTITUTED, ORAL ORAL
Qty: 1 | Refills: 0 | OUTPATIENT
Start: 2024-11-20 | End: 2024-11-22

## 2024-11-20 NOTE — HPI-BLOATING/GAS
49 year old male patient complains of bloating/gas . He admits symptoms have been present since August after returning from [place] .  Describes symptoms as abdominal discomfort in epigastric region, feeling full excess bloating . Admits symptoms are sporadic . He admits taking Gas x and Tums PRN to relieve symptoms. He denies having any previous breath test including H. Pylori or SIBO.

## 2024-11-20 NOTE — HPI-COLORECTAL CANCER SCREENING
49 year old male patient presents for colorectal cancer screening consult. He is here due to age. Admits this will be first colonoscopy. Admits family hx of colon cancer. He denies family hx of colon polyps. denies normal bowel habits at this time. Denies any current symptoms of rectal bleeding, melena or mucus.

## 2024-11-20 NOTE — HPI-CONSTIPATION
49 year old male patient presents for constipation consult. States symptoms began almost 2 months ago . He admits taking Miralax for the past week with moderate relief. Admits having 1-2 bowel movement a day. Admits stools are hard intermittently .  He denies rectal bleeding/melena. Admits strenuous bowel movements. He denies having recent abdominal Xray. He admits having recent labs.  OUTSIDE LABS  (11/09/2024) CBC w/DIFF/PLT Lymphs (absolute) - 3.4 H Eos (absolute) - 0.6 H All other values WNL  CMP Glucose - 116 H BUN/Creatinine Ratio - 8 L All other values WNL  Lipid Panel  LDL - 103 H All other values WNL  TSH - WNL A1c - 6.4 H  (09/14/2024) CMP  Glucose - 127 H BUN/Creatine Ratio - 8 L All other values WNL  Lipid Panel  HDL - 39 L All other values WNL  A1c - 6.5 H  TSH - WNL  (02/27/2024) CMP Glucose - 164 H BUN/Creatinine Ratio - 8 L AST - 42 H ALT - 63 H All other values WNL  Lipid Panel  Triglycerides - 213 H All other values WNL  A1c - 6.9 H TSH - WNL

## 2024-12-17 ENCOUNTER — OFFICE VISIT (OUTPATIENT)
Dept: URBAN - METROPOLITAN AREA SURGERY CENTER 8 | Facility: SURGERY CENTER | Age: 49
End: 2024-12-17

## 2025-01-13 ENCOUNTER — OFFICE VISIT (OUTPATIENT)
Dept: URBAN - METROPOLITAN AREA CLINIC 33 | Facility: CLINIC | Age: 50
End: 2025-01-13